# Patient Record
Sex: FEMALE | Race: OTHER | HISPANIC OR LATINO | URBAN - METROPOLITAN AREA
[De-identification: names, ages, dates, MRNs, and addresses within clinical notes are randomized per-mention and may not be internally consistent; named-entity substitution may affect disease eponyms.]

---

## 2018-12-14 ENCOUNTER — EMERGENCY (EMERGENCY)
Age: 38
LOS: 1 days | Discharge: HOME | End: 2018-12-14
Attending: EMERGENCY MEDICINE | Admitting: EMERGENCY MEDICINE
Payer: SELF-PAY

## 2018-12-14 VITALS
TEMPERATURE: 98 F | RESPIRATION RATE: 18 BRPM | DIASTOLIC BLOOD PRESSURE: 72 MMHG | HEART RATE: 89 BPM | OXYGEN SATURATION: 100 % | SYSTOLIC BLOOD PRESSURE: 116 MMHG

## 2018-12-14 LAB
ALBUMIN SERPL ELPH-MCNC: 4.1 G/DL — SIGNIFICANT CHANGE UP (ref 3.3–5)
ALP SERPL-CCNC: 85 U/L — SIGNIFICANT CHANGE UP (ref 40–120)
ALT FLD-CCNC: 15 U/L — SIGNIFICANT CHANGE UP (ref 4–33)
APPEARANCE UR: SIGNIFICANT CHANGE UP
APTT BLD: 30.4 SEC — SIGNIFICANT CHANGE UP (ref 27.5–36.3)
AST SERPL-CCNC: 16 U/L — SIGNIFICANT CHANGE UP (ref 4–32)
BACTERIA # UR AUTO: NEGATIVE — SIGNIFICANT CHANGE UP
BASE EXCESS BLDV CALC-SCNC: -0.9 MMOL/L — SIGNIFICANT CHANGE UP
BASOPHILS # BLD AUTO: 0.02 K/UL — SIGNIFICANT CHANGE UP (ref 0–0.2)
BASOPHILS # BLD AUTO: 0.02 K/UL — SIGNIFICANT CHANGE UP (ref 0–0.2)
BASOPHILS NFR BLD AUTO: 0.2 % — SIGNIFICANT CHANGE UP (ref 0–2)
BASOPHILS NFR BLD AUTO: 0.2 % — SIGNIFICANT CHANGE UP (ref 0–2)
BILIRUB SERPL-MCNC: 0.2 MG/DL — SIGNIFICANT CHANGE UP (ref 0.2–1.2)
BILIRUB UR-MCNC: NEGATIVE — SIGNIFICANT CHANGE UP
BLD GP AB SCN SERPL QL: NEGATIVE — SIGNIFICANT CHANGE UP
BLOOD GAS VENOUS - CREATININE: 0.65 MG/DL — SIGNIFICANT CHANGE UP (ref 0.5–1.3)
BLOOD UR QL VISUAL: HIGH
BUN SERPL-MCNC: 11 MG/DL — SIGNIFICANT CHANGE UP (ref 7–23)
CALCIUM SERPL-MCNC: 8.7 MG/DL — SIGNIFICANT CHANGE UP (ref 8.4–10.5)
CHLORIDE BLDV-SCNC: 108 MMOL/L — SIGNIFICANT CHANGE UP (ref 96–108)
CHLORIDE SERPL-SCNC: 104 MMOL/L — SIGNIFICANT CHANGE UP (ref 98–107)
CO2 SERPL-SCNC: 22 MMOL/L — SIGNIFICANT CHANGE UP (ref 22–31)
COLOR SPEC: COLORLESS — SIGNIFICANT CHANGE UP
CREAT SERPL-MCNC: 0.67 MG/DL — SIGNIFICANT CHANGE UP (ref 0.5–1.3)
EOSINOPHIL # BLD AUTO: 0.03 K/UL — SIGNIFICANT CHANGE UP (ref 0–0.5)
EOSINOPHIL # BLD AUTO: 0.04 K/UL — SIGNIFICANT CHANGE UP (ref 0–0.5)
EOSINOPHIL NFR BLD AUTO: 0.2 % — SIGNIFICANT CHANGE UP (ref 0–6)
EOSINOPHIL NFR BLD AUTO: 0.3 % — SIGNIFICANT CHANGE UP (ref 0–6)
GAS PNL BLDV: 137 MMOL/L — SIGNIFICANT CHANGE UP (ref 136–146)
GLUCOSE BLDV-MCNC: 100 — HIGH (ref 70–99)
GLUCOSE SERPL-MCNC: 96 MG/DL — SIGNIFICANT CHANGE UP (ref 70–99)
GLUCOSE UR-MCNC: NEGATIVE — SIGNIFICANT CHANGE UP
HCG SERPL-ACNC: < 5 MIU/ML — SIGNIFICANT CHANGE UP
HCO3 BLDV-SCNC: 24 MMOL/L — SIGNIFICANT CHANGE UP (ref 20–27)
HCT VFR BLD CALC: 25.6 % — LOW (ref 34.5–45)
HCT VFR BLD CALC: 28.4 % — LOW (ref 34.5–45)
HCT VFR BLDV CALC: 28.5 % — LOW (ref 34.5–45)
HGB BLD-MCNC: 8.2 G/DL — LOW (ref 11.5–15.5)
HGB BLD-MCNC: 9.3 G/DL — LOW (ref 11.5–15.5)
HGB BLDV-MCNC: 9.2 G/DL — LOW (ref 11.5–15.5)
HYALINE CASTS # UR AUTO: NEGATIVE — SIGNIFICANT CHANGE UP
IMM GRANULOCYTES # BLD AUTO: 0.04 # — SIGNIFICANT CHANGE UP
IMM GRANULOCYTES # BLD AUTO: 0.05 # — SIGNIFICANT CHANGE UP
IMM GRANULOCYTES NFR BLD AUTO: 0.3 % — SIGNIFICANT CHANGE UP (ref 0–1.5)
IMM GRANULOCYTES NFR BLD AUTO: 0.4 % — SIGNIFICANT CHANGE UP (ref 0–1.5)
INR BLD: 1.03 — SIGNIFICANT CHANGE UP (ref 0.88–1.17)
KETONES UR-MCNC: NEGATIVE — SIGNIFICANT CHANGE UP
LACTATE BLDV-MCNC: 1.2 MMOL/L — SIGNIFICANT CHANGE UP (ref 0.5–2)
LEUKOCYTE ESTERASE UR-ACNC: SIGNIFICANT CHANGE UP
LYMPHOCYTES # BLD AUTO: 1.3 K/UL — SIGNIFICANT CHANGE UP (ref 1–3.3)
LYMPHOCYTES # BLD AUTO: 1.79 K/UL — SIGNIFICANT CHANGE UP (ref 1–3.3)
LYMPHOCYTES # BLD AUTO: 11.2 % — LOW (ref 13–44)
LYMPHOCYTES # BLD AUTO: 14.9 % — SIGNIFICANT CHANGE UP (ref 13–44)
MCHC RBC-ENTMCNC: 27 PG — SIGNIFICANT CHANGE UP (ref 27–34)
MCHC RBC-ENTMCNC: 28.1 PG — SIGNIFICANT CHANGE UP (ref 27–34)
MCHC RBC-ENTMCNC: 32 % — SIGNIFICANT CHANGE UP (ref 32–36)
MCHC RBC-ENTMCNC: 32.7 % — SIGNIFICANT CHANGE UP (ref 32–36)
MCV RBC AUTO: 84.2 FL — SIGNIFICANT CHANGE UP (ref 80–100)
MCV RBC AUTO: 85.8 FL — SIGNIFICANT CHANGE UP (ref 80–100)
MONOCYTES # BLD AUTO: 0.6 K/UL — SIGNIFICANT CHANGE UP (ref 0–0.9)
MONOCYTES # BLD AUTO: 0.79 K/UL — SIGNIFICANT CHANGE UP (ref 0–0.9)
MONOCYTES NFR BLD AUTO: 5.2 % — SIGNIFICANT CHANGE UP (ref 2–14)
MONOCYTES NFR BLD AUTO: 6.6 % — SIGNIFICANT CHANGE UP (ref 2–14)
NEUTROPHILS # BLD AUTO: 9.38 K/UL — HIGH (ref 1.8–7.4)
NEUTROPHILS # BLD AUTO: 9.64 K/UL — HIGH (ref 1.8–7.4)
NEUTROPHILS NFR BLD AUTO: 77.8 % — HIGH (ref 43–77)
NEUTROPHILS NFR BLD AUTO: 82.7 % — HIGH (ref 43–77)
NITRITE UR-MCNC: NEGATIVE — SIGNIFICANT CHANGE UP
NRBC # FLD: 0 — SIGNIFICANT CHANGE UP
NRBC # FLD: 0 — SIGNIFICANT CHANGE UP
PCO2 BLDV: 36 MMHG — LOW (ref 41–51)
PH BLDV: 7.42 PH — SIGNIFICANT CHANGE UP (ref 7.32–7.43)
PH UR: 6.5 — SIGNIFICANT CHANGE UP (ref 5–8)
PLATELET # BLD AUTO: 324 K/UL — SIGNIFICANT CHANGE UP (ref 150–400)
PLATELET # BLD AUTO: 352 K/UL — SIGNIFICANT CHANGE UP (ref 150–400)
PMV BLD: 9.6 FL — SIGNIFICANT CHANGE UP (ref 7–13)
PMV BLD: 9.6 FL — SIGNIFICANT CHANGE UP (ref 7–13)
PO2 BLDV: 67 MMHG — HIGH (ref 35–40)
POTASSIUM BLDV-SCNC: 3.5 MMOL/L — SIGNIFICANT CHANGE UP (ref 3.4–4.5)
POTASSIUM SERPL-MCNC: 3.7 MMOL/L — SIGNIFICANT CHANGE UP (ref 3.5–5.3)
POTASSIUM SERPL-SCNC: 3.7 MMOL/L — SIGNIFICANT CHANGE UP (ref 3.5–5.3)
PROT SERPL-MCNC: 7.3 G/DL — SIGNIFICANT CHANGE UP (ref 6–8.3)
PROT UR-MCNC: NEGATIVE — SIGNIFICANT CHANGE UP
PROTHROM AB SERPL-ACNC: 11.5 SEC — SIGNIFICANT CHANGE UP (ref 9.8–13.1)
RBC # BLD: 3.04 M/UL — LOW (ref 3.8–5.2)
RBC # BLD: 3.31 M/UL — LOW (ref 3.8–5.2)
RBC # FLD: 12.6 % — SIGNIFICANT CHANGE UP (ref 10.3–14.5)
RBC # FLD: 12.9 % — SIGNIFICANT CHANGE UP (ref 10.3–14.5)
RBC CASTS # UR COMP ASSIST: >50 — HIGH (ref 0–?)
RH IG SCN BLD-IMP: POSITIVE — SIGNIFICANT CHANGE UP
RH IG SCN BLD-IMP: POSITIVE — SIGNIFICANT CHANGE UP
SAO2 % BLDV: 93 % — HIGH (ref 60–85)
SODIUM SERPL-SCNC: 140 MMOL/L — SIGNIFICANT CHANGE UP (ref 135–145)
SP GR SPEC: 1.02 — SIGNIFICANT CHANGE UP (ref 1–1.04)
SQUAMOUS # UR AUTO: SIGNIFICANT CHANGE UP
UROBILINOGEN FLD QL: NORMAL — SIGNIFICANT CHANGE UP
WBC # BLD: 11.65 K/UL — HIGH (ref 3.8–10.5)
WBC # BLD: 12.05 K/UL — HIGH (ref 3.8–10.5)
WBC # FLD AUTO: 11.65 K/UL — HIGH (ref 3.8–10.5)
WBC # FLD AUTO: 12.05 K/UL — HIGH (ref 3.8–10.5)
WBC UR QL: SIGNIFICANT CHANGE UP (ref 0–?)

## 2018-12-14 PROCEDURE — 76830 TRANSVAGINAL US NON-OB: CPT | Mod: 26

## 2018-12-14 PROCEDURE — 99285 EMERGENCY DEPT VISIT HI MDM: CPT | Mod: 25

## 2018-12-14 PROCEDURE — 74177 CT ABD & PELVIS W/CONTRAST: CPT | Mod: 26

## 2018-12-14 RX ORDER — SODIUM CHLORIDE 9 MG/ML
1000 INJECTION, SOLUTION INTRAVENOUS ONCE
Qty: 0 | Refills: 0 | Status: COMPLETED | OUTPATIENT
Start: 2018-12-14 | End: 2018-12-14

## 2018-12-14 RX ORDER — ACETAMINOPHEN 500 MG
975 TABLET ORAL ONCE
Qty: 0 | Refills: 0 | Status: COMPLETED | OUTPATIENT
Start: 2018-12-14 | End: 2018-12-14

## 2018-12-14 RX ORDER — TRANEXAMIC ACID 100 MG/ML
1000 INJECTION, SOLUTION INTRAVENOUS ONCE
Qty: 0 | Refills: 0 | Status: COMPLETED | OUTPATIENT
Start: 2018-12-14 | End: 2018-12-14

## 2018-12-14 RX ORDER — MORPHINE SULFATE 50 MG/1
4 CAPSULE, EXTENDED RELEASE ORAL ONCE
Qty: 0 | Refills: 0 | Status: DISCONTINUED | OUTPATIENT
Start: 2018-12-14 | End: 2018-12-14

## 2018-12-14 RX ORDER — ACETAMINOPHEN 500 MG
650 TABLET ORAL ONCE
Qty: 0 | Refills: 0 | Status: DISCONTINUED | OUTPATIENT
Start: 2018-12-14 | End: 2018-12-14

## 2018-12-14 RX ADMIN — TRANEXAMIC ACID 220 MILLIGRAM(S): 100 INJECTION, SOLUTION INTRAVENOUS at 22:33

## 2018-12-14 RX ADMIN — Medication 975 MILLIGRAM(S): at 22:33

## 2018-12-14 RX ADMIN — Medication 975 MILLIGRAM(S): at 19:11

## 2018-12-14 RX ADMIN — MORPHINE SULFATE 4 MILLIGRAM(S): 50 CAPSULE, EXTENDED RELEASE ORAL at 20:35

## 2018-12-14 RX ADMIN — SODIUM CHLORIDE 1000 MILLILITER(S): 9 INJECTION, SOLUTION INTRAVENOUS at 22:12

## 2018-12-14 RX ADMIN — SODIUM CHLORIDE 1000 MILLILITER(S): 9 INJECTION, SOLUTION INTRAVENOUS at 19:06

## 2018-12-14 RX ADMIN — MORPHINE SULFATE 4 MILLIGRAM(S): 50 CAPSULE, EXTENDED RELEASE ORAL at 22:12

## 2018-12-14 NOTE — ED STATDOCS - OBJECTIVE STATEMENT
I performed a medical screening examination and determined this patient to be medically stable and will transfer to the [facility] adult ED for further care. heart and lung exam done and both did not reveal concerns for immediate intervention.     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 Stable for transfer to [facility] ED

## 2018-12-14 NOTE — ED ADULT NURSE NOTE - OBJECTIVE STATEMENT
pt alert,oriented x3. skin warm,dry. c/o lower abd pains ,vag blleding since wed. denies n,v. eval by mds,iv,labs sent. will continue to monitor

## 2018-12-14 NOTE — ED PROVIDER NOTE - PHYSICAL EXAMINATION
PHYSICAL EXAM:  GENERAL: NAD, speaks in full sentences, no signs of respiratory distress  HEAD:  Atraumatic, Normocephalic  EYES: EOMI, PERRLA, conjunctiva and sclera clear  NECK: Supple, No JVD  CHEST/LUNG: Clear to auscultation bilaterally; No wheeze; No crackles; No accessory muscles used  HEART: Regular rate and rhythm; No murmurs;   ABDOMEN: Tender to lower quadrants moresore to RLQ w/ guarding  EXTREMITIES:  2+ Peripheral Pulses, No cyanosis or edema  PSYCH: AAOx3  NEUROLOGY: moving all extremities  SKIN: No rashes or lesions  Pelvic exam: bleeding visualized from closed Os, R adnexal tenderness

## 2018-12-14 NOTE — ED PROVIDER NOTE - NS ED ROS FT
CONSTITUTIONAL: No fevers, no chills  Eyes: no visual changes  Ears: no ear drainage, no ear pain  Nose: no nasal congestion  Mouth/Throat: no sore throat  Cardiovascular: No Chest pain  Respiratory: No SOB  Gastrointestinal: refer to HPI  Genitourinary: no dysuria, no hematuria  SKIN: no rashes.  NEURO: no headache  PSYCHIATRIC: no known mental health issues.

## 2018-12-14 NOTE — ED PROVIDER NOTE - OBJECTIVE STATEMENT
37yo F  p/w cc of pelvic pain    Had period start on Wednesday, normally painless, as of last night had onset of 10/10 R abdominal pain that has been constant, along with heavier bleeding then normal. Had IUP in place, no longer can feel IUD string since heavy period last month. No nausea or vomiting or diarrhea, no F/C. H/O cholestectomy 10 years ago. 39yo F  p/w cc of pelvic pain    Had period start on Wednesday, normally painless, as of last night had onset of 10/10 R abdominal pain that has been constant, along with heavier bleeding then normal. Had IUP in place, no longer can feel IUD string since heavy period last month. No nausea or vomiting or diarrhea, no F/C. H/O cholestectomy 10 years ago.    Attendinyo female presents with vaginal bleeding and RLQ pain.  Bleeding began 2 days ago.  Yesterday pt developed sharp, constant pain in the RLQ.  pain is worse with movement.  no fever.  no nausea or vomiting.

## 2018-12-14 NOTE — ED PROVIDER NOTE - MEDICAL DECISION MAKING DETAILS
37yo F  p/w cc of pelvic pain. Will get labs/pain control/fluids/ OBGYN and surgery consult due to extreme tenderness on exam w/ vaginal bleeding

## 2018-12-14 NOTE — CONSULT NOTE ADULT - ATTENDING COMMENTS
Pt seen and examined at bedside.  Speculum exam notable for small clot at the external os, with small pooling, no active bleeding at present.  No CMT.  Tenderness in the RLQ to palpation.  Mirena IUD not visualized on bedside TVUS; recommend official TVUS to r/o endometrial polyp vs fibroid as noted on CT scan.  Pt believes she may have expelled IUD last month as she had a similar episode of very heavy menses.  At this point, there is no acute gyn surgical intervention necessary as she is having a heavy menstrual cycle.  Recommend Tranexamic acid for AUB, as well as plan for outpatient removal of endometrial polyp vs fibroid as will be further elucidated on TVUS.  Would trend Hct and maintain herrera until Hg stable.  Transfuse as indicated.  Recommend Gen surg consultation w/regards to RLQ pain, as finding of epiploic appendigitis c/w patients area of pain/tenderness.  Will follow in CDU with outpatient follow up with gynecologist.

## 2018-12-14 NOTE — ED PROVIDER NOTE - PROGRESS NOTE DETAILS
bobby pgy1: OBGYN resident [facility] states this is likely menses w/ Epiploic appendagitis, recommends txa and to monitor. Pt VSS systolic >100, will give TXA as per oBGYN and monitor. bobby pgy1: pt stable, will monitor in CDU overnight as per LEXIE Acerra:  Pt with pain much improved.  hemodynamically stable.  likely pt is having menses and RLQ pain is due to epiploic appendagitis.  Will monitor overnight to ensure pt's symptoms improve and that her blood counts remain stable

## 2018-12-14 NOTE — CONSULT NOTE ADULT - SUBJECTIVE AND OBJECTIVE BOX
General Surgery Consult Note  Pager [phone]    HPI:  38-year-old woman s/p open qian 10 years ago presented with worsening RLQ abdominal pain x 2 days. Denies nausea/vomiting, fevers/chills. Normal urination and BMs.   Never had this kind of pain before.   Also has been having mennorhagia since the start of her period 2 days ago.     PAST MEDICAL & SURGICAL HISTORY:  Open cholecystectomy	    ALLERGIES:  NKA      HOME MEDICATIONS:  None      SOCIAL HISTORY:  Denies smoking and ETOH use.       FAMILY HISTORY:  No pertinent history in first degree relatives.  ___________________________________________  REVIEW OF SYSTEMS:  Constitutional: No fevers, chills, no recent weight loss  ENMT: No changes in hearing, no changes in vision, no sore throat, no cough  Respiratory: No shortness of breath  Cardiovascular: No chest pain, palpitations  Gastrointestinal: See HPI  Genitourinary: No dysuria, frequency, or urgency    Extremities: No joint swelling, no limited range of movement  Neurological: No paresthesia  Skin: No rashes  ___________________________________________  PHYSICAL EXAM:  Vital Signs Last 24 Hrs  T(C): 37.1 (14 Dec 2018 20:34), Max: 37.1 (14 Dec 2018 20:34)  T(F): 98.7 (14 Dec 2018 20:34), Max: 98.7 (14 Dec 2018 20:34)  HR: 68 (14 Dec 2018 21:35) (68 - 89)  BP: 108/59 (14 Dec 2018 21:35) (90/47 - 123/63)  BP(mean): --  RR: 18 (14 Dec 2018 21:35) (16 - 18)  SpO2: 100% (14 Dec 2018 21:35) (98% - 100%)CAPILLARY BLOOD GLUCOSE      General: A&Ox3, distress from pain.  Neuro: Motor and sensory grossly intact with no focal deficits.  HEENT: Anicteric sclerae.  Respiratory: Unlabored breathing.   CVS: Regular rate and rhythm.  Abdomen: Soft, non-distended, significant RLQ tenderness with rebound. Upper epigastric scar well-healed.  Extremities: Warm bilaterally w/ palpable pulses.   MSK: Intact ROM.  ____________________________________________  LABS:  CBC Full  -  ( 14 Dec 2018 21:30 )  WBC Count : 12.05 K/uL  Hemoglobin : 8.2 g/dL  Hematocrit : 25.6 %  Platelet Count - Automated : 324 K/uL  Mean Cell Volume : 84.2 fL  Mean Cell Hemoglobin : 27.0 pg  Mean Cell Hemoglobin Concentration : 32.0 %  Auto Neutrophil # : 9.38 K/uL  Auto Lymphocyte # : 1.79 K/uL  Auto Monocyte # : 0.79 K/uL  Auto Eosinophil # : 0.03 K/uL  Auto Basophil # : 0.02 K/uL  Auto Neutrophil % : 77.8 %  Auto Lymphocyte % : 14.9 %  Auto Monocyte % : 6.6 %  Auto Eosinophil % : 0.2 %  Auto Basophil % : 0.2 %        140  |  104  |  11  ----------------------------<  96  3.7   |  22  |  0.67    Ca    8.7      14 Dec 2018 19:10    TPro  7.3  /  Alb  4.1  /  TBili  0.2  /  DBili  x   /  AST  16  /  ALT  15  /  AlkPhos  85  12-14    LIVER FUNCTIONS - ( 14 Dec 2018 19:10 )  Alb: 4.1 g/dL / Pro: 7.3 g/dL / ALK PHOS: 85 u/L / ALT: 15 u/L / AST: 16 u/L / GGT: x           PT/INR - ( 14 Dec 2018 19:10 )   PT: 11.5 SEC;   INR: 1.03          PTT - ( 14 Dec 2018 19:10 )  PTT:30.4 SEC  Urinalysis Basic - ( 14 Dec 2018 20:40 )    Color: COLORLESS / Appearance: Lt TURBID / S.016 / pH: 6.5  Gluc: NEGATIVE / Ketone: NEGATIVE  / Bili: NEGATIVE / Urobili: NORMAL   Blood: LARGE / Protein: NEGATIVE / Nitrite: NEGATIVE   Leuk Esterase: TRACE / RBC: >50 / WBC 3-5   Sq Epi: FEW / Non Sq Epi: x / Bacteria: NEGATIVE    ____________________________________________  RADIOLOGY:  CT Abdomen and Pelvis w/ IV Cont (18 @ 20:20)   LOWER CHEST: Mild dependent atelectasis is seen at the lung bases. No   consolidation or pleural effusion.    LIVER: Within normal limits.  BILE DUCTS: Normal caliber.  GALLBLADDER: Not visualized.  SPLEEN: Within normal limits.  PANCREAS: Within normal limits.  ADRENALS: Within normal limits.  KIDNEYS/URETERS: Symmetrical enhancement. No hydronephrosis.    BLADDER: Within normal limits.  REPRODUCTIVE ORGANS: Within the mid endometrial canal, there is a   well-circumscribed 2.2 x 2cm hypodense lesion with peripheral   enhancement. A 2 cm hypodense lesion within the right ovary is   indeterminate. No intrauterine device is visualized.    BOWEL: 1.5 cm ovoid focus of fat with peripheral stranding at the level   of cecum, consistent with epiploic appendagitis. No bowel obstruction.   The appendix is not visualized.   PERITONEUM: No ascites. There are few prominent subcentimeter right lower   quadrant lymph nodes with1.5 cm ovoid focus of fat with peripheral   stranding at thelevel of cecum as mentioned above.  VESSELS:  Within normal limits.  RETROPERITONEUM: No bulky lymphadenopathy.    ABDOMINAL WALL: Within normal limits.  BONES: Within normal limits.    IMPRESSION:   Epiploic appendagitis at the level of cecum.    2.2 cm peripherally enhancing well-circumscribed endometrial lesion which   may represent a submucosal pedunculated fibroid versus polyp, better   evaluated with pelvic ultrasound. 2.2 cm hypodensity within the right   ovary which also may be better evaluated with pending pelvic ultrasound.   No intrauterine device is visualized.    The appendix is not visualized.
R2 GYN CONSULT NOTE    38y  LMP started Wednesday (last period before this ) w IUD placed at [facility] 4 years ago, presents w LLQ pain since Wednesday.  10/10 pain, pt states pain is constant, has not changed, tried taking tylenol did not help.    Denies lightheadedness, dizziness, nausea, vomiting, fevers, chills, diarrhea, constipation, urinary symptoms.  Last ate at 3pm. States has never experienced this pain before.  +VB since Wednesday, no abnl vaginal discharge.    OB/GYN HISTORY:  x6    Surgical History:  open qian     Past Medical History: denies    Social: denies T/E/D    Meds: none    Allergies: No Known Allergies    REVIEW OF SYSTEMS:  see HPI, otherwise neg      OBJECTIVE FINDINGS:    Vital Signs Last 24 Hrs  T(C): 36.6 (14 Dec 2018 17:52), Max: 36.8 (14 Dec 2018 17:22)  T(F): 97.8 (14 Dec 2018 17:52), Max: 98.2 (14 Dec 2018 17:22)  HR: 78 (14 Dec 2018 18:51) (78 - 89)  BP: 107/54 (14 Dec 2018 18:51) (107/54 - 123/63)  RR: 16 (14 Dec 2018 18:51) (16 - 18)  SpO2: 100% (14 Dec 2018 18:51) (99% - 100%)      PE:  Gen: Comfortable, NAD  CV: RRR  Pulm: CTAB  Abd: Soft, w rebound, +rovsing's sign, tender to palpation w guarding at mcburneys pt, +BS  Ext: No edema or tenderness bilaterally  Spec Exam: grossly appeared wnl, blood in vaginal vault, bleeding from cervical os noted on exam  Bimanual: +CMT, generalized abd tenderness greater in LLQ, no masses palpated in adnexa    LABS:                        9.3    11.65 )-----------( 352      ( 14 Dec 2018 19:10 )             28.4         PT/INR - ( 14 Dec 2018 19:10 )   PT: 11.5 SEC;   INR: 1.03          PTT - ( 14 Dec 2018 19:10 )  PTT:30.4 SEC        RADIOLOGY & ADDITIONAL STUDIES:  pending CT, TVUS

## 2018-12-14 NOTE — ED ADULT NURSE REASSESSMENT NOTE - NS ED NURSE REASSESS COMMENT FT1
2130: OB at bedside, herrera placed by OB, vitals stable at this time. Patient awake, alert, and mentating. No acute distress at this time, pending dispo. Will continue to monitor

## 2018-12-15 VITALS
HEART RATE: 80 BPM | OXYGEN SATURATION: 100 % | SYSTOLIC BLOOD PRESSURE: 100 MMHG | RESPIRATION RATE: 16 BRPM | TEMPERATURE: 98 F | DIASTOLIC BLOOD PRESSURE: 49 MMHG

## 2018-12-15 DIAGNOSIS — Z90.49 ACQUIRED ABSENCE OF OTHER SPECIFIED PARTS OF DIGESTIVE TRACT: Chronic | ICD-10-CM

## 2018-12-15 LAB
BASOPHILS # BLD AUTO: 0.01 K/UL — SIGNIFICANT CHANGE UP (ref 0–0.2)
BASOPHILS # BLD AUTO: 0.01 K/UL — SIGNIFICANT CHANGE UP (ref 0–0.2)
BASOPHILS NFR BLD AUTO: 0.1 % — SIGNIFICANT CHANGE UP (ref 0–2)
BASOPHILS NFR BLD AUTO: 0.1 % — SIGNIFICANT CHANGE UP (ref 0–2)
EOSINOPHIL # BLD AUTO: 0.02 K/UL — SIGNIFICANT CHANGE UP (ref 0–0.5)
EOSINOPHIL # BLD AUTO: 0.08 K/UL — SIGNIFICANT CHANGE UP (ref 0–0.5)
EOSINOPHIL NFR BLD AUTO: 0.2 % — SIGNIFICANT CHANGE UP (ref 0–6)
EOSINOPHIL NFR BLD AUTO: 1.1 % — SIGNIFICANT CHANGE UP (ref 0–6)
HBA1C BLD-MCNC: 5.1 % — SIGNIFICANT CHANGE UP (ref 4–5.6)
HCT VFR BLD CALC: 24.3 % — LOW (ref 34.5–45)
HCT VFR BLD CALC: 25 % — LOW (ref 34.5–45)
HGB BLD-MCNC: 7.9 G/DL — LOW (ref 11.5–15.5)
HGB BLD-MCNC: 7.9 G/DL — LOW (ref 11.5–15.5)
IMM GRANULOCYTES # BLD AUTO: 0.02 # — SIGNIFICANT CHANGE UP
IMM GRANULOCYTES # BLD AUTO: 0.03 # — SIGNIFICANT CHANGE UP
IMM GRANULOCYTES NFR BLD AUTO: 0.3 % — SIGNIFICANT CHANGE UP (ref 0–1.5)
IMM GRANULOCYTES NFR BLD AUTO: 0.3 % — SIGNIFICANT CHANGE UP (ref 0–1.5)
LYMPHOCYTES # BLD AUTO: 1.49 K/UL — SIGNIFICANT CHANGE UP (ref 1–3.3)
LYMPHOCYTES # BLD AUTO: 17.2 % — SIGNIFICANT CHANGE UP (ref 13–44)
LYMPHOCYTES # BLD AUTO: 2.37 K/UL — SIGNIFICANT CHANGE UP (ref 1–3.3)
LYMPHOCYTES # BLD AUTO: 33.5 % — SIGNIFICANT CHANGE UP (ref 13–44)
MCHC RBC-ENTMCNC: 27.4 PG — SIGNIFICANT CHANGE UP (ref 27–34)
MCHC RBC-ENTMCNC: 27.5 PG — SIGNIFICANT CHANGE UP (ref 27–34)
MCHC RBC-ENTMCNC: 31.6 % — LOW (ref 32–36)
MCHC RBC-ENTMCNC: 32.5 % — SIGNIFICANT CHANGE UP (ref 32–36)
MCV RBC AUTO: 84.4 FL — SIGNIFICANT CHANGE UP (ref 80–100)
MCV RBC AUTO: 87.1 FL — SIGNIFICANT CHANGE UP (ref 80–100)
MONOCYTES # BLD AUTO: 0.41 K/UL — SIGNIFICANT CHANGE UP (ref 0–0.9)
MONOCYTES # BLD AUTO: 0.49 K/UL — SIGNIFICANT CHANGE UP (ref 0–0.9)
MONOCYTES NFR BLD AUTO: 5.6 % — SIGNIFICANT CHANGE UP (ref 2–14)
MONOCYTES NFR BLD AUTO: 5.8 % — SIGNIFICANT CHANGE UP (ref 2–14)
NEUTROPHILS # BLD AUTO: 4.18 K/UL — SIGNIFICANT CHANGE UP (ref 1.8–7.4)
NEUTROPHILS # BLD AUTO: 6.64 K/UL — SIGNIFICANT CHANGE UP (ref 1.8–7.4)
NEUTROPHILS NFR BLD AUTO: 59.2 % — SIGNIFICANT CHANGE UP (ref 43–77)
NEUTROPHILS NFR BLD AUTO: 76.6 % — SIGNIFICANT CHANGE UP (ref 43–77)
NRBC # FLD: 0 — SIGNIFICANT CHANGE UP
NRBC # FLD: 0 — SIGNIFICANT CHANGE UP
PLATELET # BLD AUTO: 307 K/UL — SIGNIFICANT CHANGE UP (ref 150–400)
PLATELET # BLD AUTO: 322 K/UL — SIGNIFICANT CHANGE UP (ref 150–400)
PMV BLD: 10.1 FL — SIGNIFICANT CHANGE UP (ref 7–13)
PMV BLD: 10.1 FL — SIGNIFICANT CHANGE UP (ref 7–13)
RBC # BLD: 2.87 M/UL — LOW (ref 3.8–5.2)
RBC # BLD: 2.88 M/UL — LOW (ref 3.8–5.2)
RBC # FLD: 12.8 % — SIGNIFICANT CHANGE UP (ref 10.3–14.5)
RBC # FLD: 12.8 % — SIGNIFICANT CHANGE UP (ref 10.3–14.5)
WBC # BLD: 7.07 K/UL — SIGNIFICANT CHANGE UP (ref 3.8–10.5)
WBC # BLD: 8.68 K/UL — SIGNIFICANT CHANGE UP (ref 3.8–10.5)
WBC # FLD AUTO: 7.07 K/UL — SIGNIFICANT CHANGE UP (ref 3.8–10.5)
WBC # FLD AUTO: 8.68 K/UL — SIGNIFICANT CHANGE UP (ref 3.8–10.5)

## 2018-12-15 PROCEDURE — 99234 HOSP IP/OBS SM DT SF/LOW 45: CPT

## 2018-12-15 RX ORDER — TRANEXAMIC ACID 100 MG/ML
2 INJECTION, SOLUTION INTRAVENOUS
Qty: 30 | Refills: 0 | OUTPATIENT
Start: 2018-12-15 | End: 2018-12-19

## 2018-12-15 RX ORDER — ACETAMINOPHEN 500 MG
1000 TABLET ORAL ONCE
Qty: 0 | Refills: 0 | Status: COMPLETED | OUTPATIENT
Start: 2018-12-15 | End: 2018-12-15

## 2018-12-15 RX ORDER — FERROUS SULFATE 325(65) MG
1 TABLET ORAL
Qty: 14 | Refills: 0 | OUTPATIENT
Start: 2018-12-15 | End: 2018-12-28

## 2018-12-15 RX ORDER — DOCUSATE SODIUM 100 MG
1 CAPSULE ORAL
Qty: 28 | Refills: 0 | OUTPATIENT
Start: 2018-12-15 | End: 2018-12-28

## 2018-12-15 RX ORDER — TRANEXAMIC ACID 100 MG/ML
1300 INJECTION, SOLUTION INTRAVENOUS ONCE
Qty: 0 | Refills: 0 | Status: DISCONTINUED | OUTPATIENT
Start: 2018-12-15 | End: 2018-12-15

## 2018-12-15 RX ORDER — TRANEXAMIC ACID 100 MG/ML
1300 INJECTION, SOLUTION INTRAVENOUS THREE TIMES A DAY
Qty: 0 | Refills: 0 | Status: DISCONTINUED | OUTPATIENT
Start: 2018-12-15 | End: 2018-12-18

## 2018-12-15 RX ORDER — FERROUS SULFATE 325(65) MG
1 TABLET ORAL
Qty: 14 | Refills: 0 | DISCHARGE
Start: 2018-12-15 | End: 2018-12-28

## 2018-12-15 RX ORDER — TRANEXAMIC ACID 650 MG/1
2 TABLET ORAL
Qty: 30 | Refills: 0 | DISCHARGE
Start: 2018-12-15 | End: 2018-12-19

## 2018-12-15 RX ADMIN — Medication 1000 MILLIGRAM(S): at 05:17

## 2018-12-15 RX ADMIN — Medication 400 MILLIGRAM(S): at 04:47

## 2018-12-15 RX ADMIN — TRANEXAMIC ACID 1300 MILLIGRAM(S): 100 INJECTION, SOLUTION INTRAVENOUS at 06:44

## 2018-12-15 RX ADMIN — TRANEXAMIC ACID 1000 MILLIGRAM(S): 100 INJECTION, SOLUTION INTRAVENOUS at 00:24

## 2018-12-15 RX ADMIN — TRANEXAMIC ACID 1300 MILLIGRAM(S): 100 INJECTION, SOLUTION INTRAVENOUS at 13:20

## 2018-12-15 NOTE — PROGRESS NOTE ADULT - SUBJECTIVE AND OBJECTIVE BOX
SUBJECTIVE: 39yo Woman seen and examined during morning rounds. No acute events overnight. Afebrile, VS stable. Pain well controlled with current regimen.     OBJECTIVE:    Physical Exam:  Gen: Resting in bed, NAD, alert and oriented  Resp: respirations unlabored, no increased WOB   Abd: soft, nondistended, improved RLQ tenderness to palpation and percussion    Vital Signs Last 24 Hrs  T(C): 36.7 (15 Dec 2018 10:18), Max: 37.1 (14 Dec 2018 20:34)  T(F): 98 (15 Dec 2018 10:18), Max: 98.7 (14 Dec 2018 20:34)  HR: 80 (15 Dec 2018 10:18) (63 - 89)  BP: 100/49 (15 Dec 2018 10:18) (90/47 - 123/63)  BP(mean): --  RR: 16 (15 Dec 2018 10:18) (16 - 18)  SpO2: 100% (15 Dec 2018 10:18) (98% - 100%)    I&O's Detail    14 Dec 2018 07:01  -  15 Dec 2018 07:00  --------------------------------------------------------  IN:    Oral Fluid: 150 mL  Total IN: 150 mL    OUT:    Indwelling Catheter - Urethral: 800 mL  Total OUT: 800 mL    Total NET: -650 mL      MEDICATIONS  (STANDING):  tranexamic  acid 1300 milliGRAM(s) Oral three times a day    MEDICATIONS  (PRN):      LABS:                        7.9    7.07  )-----------( 322      ( 15 Dec 2018 09:30 )             25.0       12-14    140  |  104  |  11  ----------------------------<  96  3.7   |  22  |  0.67    Ca    8.7      14 Dec 2018 19:10    TPro  7.3  /  Alb  4.1  /  TBili  0.2  /  DBili  x   /  AST  16  /  ALT  15  /  AlkPhos  85  12-14          NEGATIVE 12-14-18 @ 20:40

## 2018-12-15 NOTE — ED CDU PROVIDER INITIAL DAY NOTE - OBJECTIVE STATEMENT
39yo F  p/w cc of pelvic pain  Had period start on Wednesday, normally painless, as of last night had onset of 10/10 R abdominal pain that has been constant, along with heavier bleeding then normal. Had IUP in place, no longer can feel IUD string since heavy period last month. No nausea or vomiting or diarrhea, no F/C. H/O cholestectomy 10 years ago.    CDU POOL Hernandez Note--------  37 yo female, no stated PMH other than heavy menses, presented to the ED for vaginal bleeding and abdominal pain.  Pt. was evaluated in the ED, CT scan showed "Epiploic appendagitis at the level of cecum.  2.2 cm peripherally enhancing well-circumscribed endometrial lesion which may represent a submucosal pedunculated fibroid versus polyp, better evaluated with pelvic ultrasound. 2.2 cm hypodensity within the right ovary which also may be better evaluated with pending pelvic ultrasound. No intrauterine device is visualized.  The appendix is not visualized.".  US was performed which showed: "No sonographic evidence of ovarian torsion. Suboptimal visualization of endometrial echo-complex measuring 4mm. Incompletely characterized 2.8 x 2.5 x 2.4 cm heterogeneous lesion abutting or at the level of the endometrial echo complex. The findings are nonspecific, this may represent intracavitary/submucosal fibroid. However complex endometrial polypoid lesion not excluded. GYN consultation and direct visualization is advised to exclude endometrial malignancy.".    Surgery and Gyn were consulted; Surgery team advised "NSAIDs for epiploic appendigitis. No surgical intervention required."; Gyn had placed Walker catheter during their initial evaluation, advised TXA, serial CBC trending; state Walker can be removed once Hb/Hct has demonstrated stability.  On CDU arrival, pt with no active c/o; states no abdominal pain at time of arrival.  Pt denies dizziness, lightheadedness.   No other c/o.  No hx/o recent illness other than above. 39yo F  p/w cc of pelvic pain  Had period start on Wednesday, normally painless, as of last night had onset of 10/10 R abdominal pain that has been constant, along with heavier bleeding then normal. Had IUP in place, no longer can feel IUD string since heavy period last month. No nausea or vomiting or diarrhea, no F/C. H/O cholestectomy 10 years ago.    CDU POOL Hernandez Note--------  39 yo female, no stated PMH other than heavy menses, presented to the ED for vaginal bleeding and abdominal pain.  Pt. was evaluated in the ED, CT scan showed "Epiploic appendagitis at the level of cecum.  2.2 cm peripherally enhancing well-circumscribed endometrial lesion which may represent a submucosal pedunculated fibroid versus polyp, better evaluated with pelvic ultrasound. 2.2 cm hypodensity within the right ovary which also may be better evaluated with pending pelvic ultrasound. No intrauterine device is visualized.  The appendix is not visualized.".  US was performed which showed: "No sonographic evidence of ovarian torsion. Suboptimal visualization of endometrial echo-complex measuring 4mm. Incompletely characterized 2.8 x 2.5 x 2.4 cm heterogeneous lesion abutting or at the level of the endometrial echo complex. The findings are nonspecific, this may represent intracavitary/submucosal fibroid. However complex endometrial polypoid lesion not excluded. GYN consultation and direct visualization is advised to exclude endometrial malignancy.".    Surgery and Gyn were consulted; Surgery team advised "NSAIDs for epiploic appendigitis. No surgical intervention required."; Gyn had placed Walker catheter during their initial evaluation, advised TXA, serial CBC trending; state Walker can be removed once Hb/Hct has demonstrated stability.  On CDU arrival, pt with no active c/o; states no abdominal pain at time of arrival.  Pt denies dizziness, lightheadedness.   No other c/o.  No hx/o recent illness other than above.    Acerra:  Pt with vaginal bleeding and rlq pain.  pain resolved.  Vaginal bleeding slowed down.  recommending observation for serial abdominal exams and h/h.

## 2018-12-15 NOTE — PROGRESS NOTE ADULT - SUBJECTIVE AND OBJECTIVE BOX
Pt re-evaluated and seen and examined at bedside. Pt's vaginal bleeding decreased. C/o HA, but denies lightheadedness dizziness, SOB, CP. Endorses abdominal cramping, but is tolerable.    T(F): 98 (12-15-18 @ 10:18), Max: 98.7 (18 @ 20:34)  HR: 80 (12-15-18 @ 10:18) (63 - 89)  BP: 100/49 (12-15-18 @ 10:18) (90/47 - 123/63)  RR: 16 (12-15-18 @ 10:18) (16 - 18)  SpO2: 100% (12-15-18 @ 10:18) (98% - 100%)  Wt(kg): --  I&O's Summary    14 Dec 2018 07:01  -  15 Dec 2018 07:00  --------------------------------------------------------  IN: 150 mL / OUT: 800 mL / NET: -650 mL    15 Dec 2018 07:01  -  15 Dec 2018 12:14  --------------------------------------------------------  IN: 0 mL / OUT: 400 mL / NET: -400 mL        MEDICATIONS  (STANDING):  tranexamic  acid 1300 milliGRAM(s) Oral three times a day    MEDICATIONS  (PRN):      Physical Exam:  Constitutional: NAD  Pulmonary: clear to auscultation bilaterally   Cardiovascular: Regular rate and rhythm   Abdomen: soft, nontender, nondistended  Extremities: no lower extremity edema or calf tenderness    LABS:                        7.9    7.07  )-----------( 322      ( 15 Dec 2018 09:30 )             25.0   baso 0.1    eos 1.1    imm gran 0.3    lymph 33.5   mono 5.8    poly 59.2                         7.9    8.68  )-----------( 307      ( 15 Dec 2018 03:30 )             24.3   baso 0.1    eos 0.2    imm gran 0.3    lymph 17.2   mono 5.6    poly 76.6                         8.2    12.05 )-----------( 324      ( 14 Dec 2018 21:30 )             25.6   baso 0.2    eos 0.2    imm gran 0.3    lymph 14.9   mono 6.6    poly 77.8                         9.3    11.65 )-----------( 352      ( 14 Dec 2018 19:10 )             28.4   baso 0.2    eos 0.3    imm gran 0.4    lymph 11.2   mono 5.2    poly 82.7      @ 19:10    140  |  104  |  11  ----------------------------<  96  3.7   |  22  |  0.67        TPro  7.3  /  Alb  4.1  /  TBili  0.2  /  DBili  x   /  AST  16  /  ALT  15  /  AlkPhos  85   @ 19:10    PT/INR - ( 14 Dec 2018 19:10 )   PT: 11.5 SEC;   INR: 1.03          PTT - ( 14 Dec 2018 19:10 )  PTT:30.4 SEC  Urinalysis Basic - ( 14 Dec 2018 20:40 )    Color: COLORLESS / Appearance: Lt TURBID / S.016 / pH: 6.5  Gluc: NEGATIVE / Ketone: NEGATIVE  / Bili: NEGATIVE / Urobili: NORMAL   Blood: LARGE / Protein: NEGATIVE / Nitrite: NEGATIVE   Leuk Esterase: TRACE / RBC: >50 / WBC 3-5   Sq Epi: FEW / Non Sq Epi: x / Bacteria: NEGATIVE        RADIOLOGY & ADDITIONAL TESTS:

## 2018-12-15 NOTE — ED CDU PROVIDER DISPOSITION NOTE - ATTENDING CONTRIBUTION TO CARE
Deanne: 37 yo female with a h/o menorrhagia presented to the ED with heavy vaginal bleeding and RLQ abdominal pain. Pt found to be anemic and had epiploic appendagitis on CTAP. Pt pain now well controlled and feeling much better. Denies chets pain, SOB, dizziness, and near syncope. Pt evaluated by GYN in the ED who recommended TXA. Herrera was placed for questionable urinary retention on CT?. GYN recommended TXA. Bleeding has now resolved. Exam: GENERAL: well appearing, NAD, HEENT: MMM, PERRLA, CARDIO: +S1/S2, no murmurs, rubs or gallops, LUNGS: CTA B/L, no wheezing, rales or rhonchi, ABD: soft, nontender, BSx4 quadrants, no guarding or rigidity. : deferred to GYN MSK: No CVA TTP NEURO: AxOx3, SKIN: no rashes or lesions, well perfused A/P- 37 yo female with menorrhagia and anemia. Pt reevaluated by GYN, hemoglobin has been stable, pt spontaneously voiding without herrera. bleeding now resolved s/p TXA. will d/c home to f/u with outpatient GYN.

## 2018-12-15 NOTE — ED CDU PROVIDER DISPOSITION NOTE - NSFOLLOWUPINSTRUCTIONS_ED_ALL_ED_FT
Advance activity as tolerated.  Continue all previously prescribed medications as directed unless otherwise instructed.  Take Lysteda 650 mg two pills three times a day until vaginal bleeding decreases or stops; take for no more than 5 days.  Take Tylenol 650mg (Two 325 mg pills) every 4-6 hours as needed for pain or fevers.  Take Iron supplement as directed.  Take Colace 100 mg twice a day as needed for constipation.  Follow up with your primary care physician and OB/GYN (referal list provided or call [phone] to make an appointment with the OB/GYN clinic)  in 48-72 hours- bring copies of your results.  Return to the ER for worsening or persistent symptoms, including but not limited to any worsening/persistent vaginal bleeding, abdominal pain, lightheadedness, passing out and/or ANY NEW OR CONCERNING SYMPTOMS. If you have issues obtaining follow up, please call: [phone] to obtain a doctor or specialist who takes your insurance in your area.  You may call [phone] to make an appointment with the internal medicine clinic.

## 2018-12-15 NOTE — ED CDU PROVIDER INITIAL DAY NOTE - GASTROINTESTINAL, MLM
Abdomen soft, non-tender, no rebound, no guarding. Abdomen soft, TTP to right mid abdomen, left mid abdomen, and suprapubic region.  Otherwise, abdomen non-tender, no rebound, no guarding.

## 2018-12-15 NOTE — ED CDU PROVIDER DISPOSITION NOTE - CLINICAL COURSE
Deanne: 39 yo female with a h/o menorrhagia presented to the ED with heavy vaginal bleeding and RLQ abdominal pain. Pt found to be anemic and had epiploic appendagitis on CTAP. Pt pain now well controlled and feeling much better. Denies chets pain, SOB, dizziness, and near syncope. Pt evaluated by GYN in the ED who recommended TXA. Herrera was placed for questionable urinary retention on CT?. GYN recommended TXA. Bleeding has now resolved. Exam: GENERAL: well appearing, NAD, HEENT: MMM, PERRLA, CARDIO: +S1/S2, no murmurs, rubs or gallops, LUNGS: CTA B/L, no wheezing, rales or rhonchi, ABD: soft, nontender, BSx4 quadrants, no guarding or rigidity. : deferred to GYN MSK: No CVA TTP NEURO: AxOx3, SKIN: no rashes or lesions, well perfused A/P- 39 yo female with menorrhagia and anemia. Pt reevaluated by GYN, hemoglobin has been stable, pt spontaneously voiding without herrera. bleeding now resolved s/p TXA. will d/c home to f/u with outpatient GYN.

## 2018-12-15 NOTE — ED CDU PROVIDER INITIAL DAY NOTE - PROGRESS NOTE DETAILS
Pt stable, comfortable appearing, c/o abd pain c/w same symptoms pt has been having (as per pt).  330a cbc:  Hb 7.9; discussed with Gyn; Gyn feels H/H stable for now.  Next CBC due at 0930 hrs.  Tylenol IV will be given for pain. POOL Kelley:  Pt notes feeling better, reporting only mild RLQ pain.  Pt notes vaginal bleeding lighter today.  Pt denies any lightheadedness, chest pain, SOB, palpitations, syncope.  Hgb 7.9 this morning.  Repeat Hgb to be performed at 9:30a.  Abdomen soft, minimally tender at RLQ; no guarding; no rigidity; normoactive bowel sounds. POOL Kelley: Hgb stable at 7.9.  Pt has not had any more significant vaginal bleeding.  GYN recommends discharge and Lysteda 1300 mg TID x at most 5 days until vaginal bleeding decreases or stops.  Pt medically stable for discharge.  Pt denies any lightheadedness, chest pain or shortness of breath.  Pt to follow up with PMD and ob/gyn. POOL Kelley: Hgb stable at 7.9.  Pt has not had any more significant vaginal bleeding.  GYN recommends discharge and Lysteda 1300 mg TID x at most 5 days until vaginal bleeding decreases or stops.  Pt medically stable for discharge.  Pt denies any lightheadedness, chest pain or shortness of breath.  Pt to follow up with PMD and ob/gyn.  [place]  ID [id number] used for discharge.

## 2018-12-15 NOTE — ED CDU PROVIDER INITIAL DAY NOTE - MEDICAL DECISION MAKING DETAILS
Vaginal bleeding / heavy menses:  TXA per Gyn team recommendations / Walker catheter to stay in place until Hb/Hct show stability, serial CBCs, general observation care / monitoring.  Epiploic appendagitis:  Analgesia PRN, supportive care, periodic abdominal exams, Surgery team reassessment.

## 2018-12-15 NOTE — PROGRESS NOTE ADULT - ASSESSMENT
Vaginal bleeding improved with TXA. H/H stable. VSS.     -Cleared for discharge home with f/u outpatient. Will email [facility] to schedule appt.  -Lysteda 1300 mg TID during heavy menses for up to 5 days or until VB stops  -Fe for anemia  -Tylenol for MORRIS     SAMI Clifton PGY2

## 2018-12-16 LAB
BACTERIA UR CULT: SIGNIFICANT CHANGE UP
SPECIMEN SOURCE: SIGNIFICANT CHANGE UP

## 2021-09-17 NOTE — PROGRESS NOTE ADULT - ASSESSMENT
Assessment/Plan: 38y Female with RLQ abdominal pain, found to have epiploic appendigitis and an endometrial lesion (fibroid vs. polyp).   Slight leukocytosis. Hemodynamically stable.    - NSAIDs for epiploic appendigitis. No surgical intervention required.  - Follow up gynecology recommendations regarding endometrial lesion (?degenerative fibroid)  - If symptoms worsen, consider repeating the CT scan to better visualize the appendix  - No acute surgical needs at this time. Please page us with any further questions or concerns.    JUAN Anderson, PGY-2  B Team Surgery  p. [phone] patient declined/done

## 2024-06-24 ENCOUNTER — EMERGENCY (EMERGENCY)
Facility: HOSPITAL | Age: 44
LOS: 1 days | Discharge: HOME | End: 2024-06-24
Attending: STUDENT IN AN ORGANIZED HEALTH CARE EDUCATION/TRAINING PROGRAM | Admitting: EMERGENCY MEDICINE
Payer: MEDICAID

## 2024-06-24 VITALS
RESPIRATION RATE: 16 BRPM | HEART RATE: 94 BPM | DIASTOLIC BLOOD PRESSURE: 85 MMHG | OXYGEN SATURATION: 97 % | TEMPERATURE: 98 F | SYSTOLIC BLOOD PRESSURE: 125 MMHG | WEIGHT: 128.97 LBS | HEIGHT: 56 IN

## 2024-06-24 DIAGNOSIS — Z90.49 ACQUIRED ABSENCE OF OTHER SPECIFIED PARTS OF DIGESTIVE TRACT: Chronic | ICD-10-CM

## 2024-06-24 LAB
ALBUMIN SERPL ELPH-MCNC: 4.1 G/DL — SIGNIFICANT CHANGE UP (ref 3.3–5)
ALP SERPL-CCNC: 118 U/L — SIGNIFICANT CHANGE UP (ref 40–120)
ALT FLD-CCNC: 17 U/L — SIGNIFICANT CHANGE UP (ref 4–33)
ANION GAP SERPL CALC-SCNC: 13 MMOL/L — SIGNIFICANT CHANGE UP (ref 7–14)
APPEARANCE UR: CLEAR — SIGNIFICANT CHANGE UP
APTT BLD: 31.2 SEC — SIGNIFICANT CHANGE UP (ref 24.5–35.6)
AST SERPL-CCNC: 19 U/L — SIGNIFICANT CHANGE UP (ref 4–32)
BACTERIA # UR AUTO: ABNORMAL /HPF
BASOPHILS # BLD AUTO: 0.03 K/UL — SIGNIFICANT CHANGE UP (ref 0–0.2)
BASOPHILS NFR BLD AUTO: 0.2 % — SIGNIFICANT CHANGE UP (ref 0–2)
BILIRUB SERPL-MCNC: <0.2 MG/DL — SIGNIFICANT CHANGE UP (ref 0.2–1.2)
BILIRUB UR-MCNC: NEGATIVE — SIGNIFICANT CHANGE UP
BUN SERPL-MCNC: 17 MG/DL — SIGNIFICANT CHANGE UP (ref 7–23)
CALCIUM SERPL-MCNC: 9 MG/DL — SIGNIFICANT CHANGE UP (ref 8.4–10.5)
CAST: 0 /LPF — SIGNIFICANT CHANGE UP (ref 0–4)
CHLORIDE SERPL-SCNC: 104 MMOL/L — SIGNIFICANT CHANGE UP (ref 98–107)
CO2 SERPL-SCNC: 20 MMOL/L — LOW (ref 22–31)
COLOR SPEC: YELLOW — SIGNIFICANT CHANGE UP
CREAT SERPL-MCNC: 0.5 MG/DL — SIGNIFICANT CHANGE UP (ref 0.5–1.3)
DIFF PNL FLD: ABNORMAL
EGFR: 120 ML/MIN/1.73M2 — SIGNIFICANT CHANGE UP
EOSINOPHIL # BLD AUTO: 0.06 K/UL — SIGNIFICANT CHANGE UP (ref 0–0.5)
EOSINOPHIL NFR BLD AUTO: 0.4 % — SIGNIFICANT CHANGE UP (ref 0–6)
GLUCOSE SERPL-MCNC: 91 MG/DL — SIGNIFICANT CHANGE UP (ref 70–99)
GLUCOSE UR QL: NEGATIVE MG/DL — SIGNIFICANT CHANGE UP
HCG SERPL-ACNC: <1 MIU/ML — SIGNIFICANT CHANGE UP
HCT VFR BLD CALC: 32.2 % — LOW (ref 34.5–45)
HGB BLD-MCNC: 10.5 G/DL — LOW (ref 11.5–15.5)
IANC: 13.86 K/UL — HIGH (ref 1.8–7.4)
IMM GRANULOCYTES NFR BLD AUTO: 0.5 % — SIGNIFICANT CHANGE UP (ref 0–0.9)
INR BLD: 0.95 RATIO — SIGNIFICANT CHANGE UP (ref 0.85–1.18)
KETONES UR-MCNC: NEGATIVE MG/DL — SIGNIFICANT CHANGE UP
LEUKOCYTE ESTERASE UR-ACNC: NEGATIVE — SIGNIFICANT CHANGE UP
LIDOCAIN IGE QN: 44 U/L — SIGNIFICANT CHANGE UP (ref 7–60)
LYMPHOCYTES # BLD AUTO: 1.44 K/UL — SIGNIFICANT CHANGE UP (ref 1–3.3)
LYMPHOCYTES # BLD AUTO: 8.8 % — LOW (ref 13–44)
MCHC RBC-ENTMCNC: 25.6 PG — LOW (ref 27–34)
MCHC RBC-ENTMCNC: 32.6 GM/DL — SIGNIFICANT CHANGE UP (ref 32–36)
MCV RBC AUTO: 78.5 FL — LOW (ref 80–100)
MONOCYTES # BLD AUTO: 0.83 K/UL — SIGNIFICANT CHANGE UP (ref 0–0.9)
MONOCYTES NFR BLD AUTO: 5.1 % — SIGNIFICANT CHANGE UP (ref 2–14)
NEUTROPHILS # BLD AUTO: 13.86 K/UL — HIGH (ref 1.8–7.4)
NEUTROPHILS NFR BLD AUTO: 85 % — HIGH (ref 43–77)
NITRITE UR-MCNC: NEGATIVE — SIGNIFICANT CHANGE UP
NRBC # BLD: 0 /100 WBCS — SIGNIFICANT CHANGE UP (ref 0–0)
NRBC # FLD: 0 K/UL — SIGNIFICANT CHANGE UP (ref 0–0)
PH UR: 5.5 — SIGNIFICANT CHANGE UP (ref 5–8)
PLATELET # BLD AUTO: 384 K/UL — SIGNIFICANT CHANGE UP (ref 150–400)
POTASSIUM SERPL-MCNC: 3.6 MMOL/L — SIGNIFICANT CHANGE UP (ref 3.5–5.3)
POTASSIUM SERPL-SCNC: 3.6 MMOL/L — SIGNIFICANT CHANGE UP (ref 3.5–5.3)
PROT SERPL-MCNC: 7.1 G/DL — SIGNIFICANT CHANGE UP (ref 6–8.3)
PROT UR-MCNC: SIGNIFICANT CHANGE UP MG/DL
PROTHROM AB SERPL-ACNC: 10.7 SEC — SIGNIFICANT CHANGE UP (ref 9.5–13)
RBC # BLD: 4.1 M/UL — SIGNIFICANT CHANGE UP (ref 3.8–5.2)
RBC # FLD: 15.5 % — HIGH (ref 10.3–14.5)
RBC CASTS # UR COMP ASSIST: 46 /HPF — HIGH (ref 0–4)
SODIUM SERPL-SCNC: 137 MMOL/L — SIGNIFICANT CHANGE UP (ref 135–145)
SP GR SPEC: 1.03 — SIGNIFICANT CHANGE UP (ref 1–1.03)
SQUAMOUS # UR AUTO: 4 /HPF — SIGNIFICANT CHANGE UP (ref 0–5)
UROBILINOGEN FLD QL: 0.2 MG/DL — SIGNIFICANT CHANGE UP (ref 0.2–1)
WBC # BLD: 16.3 K/UL — HIGH (ref 3.8–10.5)
WBC # FLD AUTO: 16.3 K/UL — HIGH (ref 3.8–10.5)
WBC UR QL: 4 /HPF — SIGNIFICANT CHANGE UP (ref 0–5)

## 2024-06-24 PROCEDURE — 74177 CT ABD & PELVIS W/CONTRAST: CPT | Mod: 26,MC

## 2024-06-24 PROCEDURE — 99285 EMERGENCY DEPT VISIT HI MDM: CPT

## 2024-06-24 PROCEDURE — 93010 ELECTROCARDIOGRAM REPORT: CPT

## 2024-06-24 PROCEDURE — 99282 EMERGENCY DEPT VISIT SF MDM: CPT

## 2024-06-24 RX ORDER — SODIUM CHLORIDE 9 MG/ML
1000 INJECTION INTRAMUSCULAR; INTRAVENOUS; SUBCUTANEOUS ONCE
Refills: 0 | Status: COMPLETED | OUTPATIENT
Start: 2024-06-24 | End: 2024-06-24

## 2024-06-24 RX ORDER — MORPHINE SULFATE 50 MG/1
4 CAPSULE, EXTENDED RELEASE ORAL ONCE
Refills: 0 | Status: DISCONTINUED | OUTPATIENT
Start: 2024-06-24 | End: 2024-06-24

## 2024-06-24 RX ORDER — CEFTRIAXONE 500 MG/1
1000 INJECTION, POWDER, FOR SOLUTION INTRAMUSCULAR; INTRAVENOUS ONCE
Refills: 0 | Status: COMPLETED | OUTPATIENT
Start: 2024-06-24 | End: 2024-06-24

## 2024-06-24 RX ORDER — KETOROLAC TROMETHAMINE 30 MG/ML
15 SYRINGE (ML) INJECTION ONCE
Refills: 0 | Status: DISCONTINUED | OUTPATIENT
Start: 2024-06-24 | End: 2024-06-24

## 2024-06-24 RX ADMIN — CEFTRIAXONE 100 MILLIGRAM(S): 500 INJECTION, POWDER, FOR SOLUTION INTRAMUSCULAR; INTRAVENOUS at 20:57

## 2024-06-24 RX ADMIN — MORPHINE SULFATE 4 MILLIGRAM(S): 50 CAPSULE, EXTENDED RELEASE ORAL at 20:50

## 2024-06-24 RX ADMIN — Medication 15 MILLIGRAM(S): at 20:49

## 2024-06-24 RX ADMIN — SODIUM CHLORIDE 1000 MILLILITER(S): 9 INJECTION INTRAMUSCULAR; INTRAVENOUS; SUBCUTANEOUS at 20:48

## 2024-06-24 NOTE — ED ADULT NURSE NOTE - OBJECTIVE STATEMENT
Pt. presents to intake 12 c/o severe umbilical pain that began today. Denies CP SOB n/v/d dysuria fever/chills.  RAC18G labs sent medicated per order. pending CT/ lab results Pt. presents to intake 12 c/o severe umbilical pain that began today. Denies CP SOB n/v/d dysuria fever/chills.  PMHx liver tumor & gallstones. RAC18G labs sent medicated per order. pending CT/ lab results

## 2024-06-24 NOTE — ED PROVIDER NOTE - CHIEF COMPLAINT
----- Message from Sabrina Bio sent at 1/10/2022  7:49 AM EST -----  Subject: Refill Request    QUESTIONS  Name of Medication? venlafaxine (EFFEXOR XR) 37.5 MG extended release   capsule  Patient-reported dosage and instructions? 37.5 mg / daily  How many days do you have left? 1  Preferred Pharmacy? [id number]  Pharmacy phone number (if available)? [phone]  Additional Information for Provider? Urgent low on meds, please fill asap   ---------------------------------------------------------------------------  --------------  CALL BACK INFO  What is the best way for the office to contact you? OK to leave message on   voicemail  Preferred Call Back Phone Number?  [phone] The patient is a 43y Female complaining of abdominal pain.

## 2024-06-24 NOTE — ED PROVIDER NOTE - PATIENT PORTAL LINK FT
You can access the FollowMyHealth Patient Portal offered by [facility] by registering at the following website: [email or web address]. By joining [employer]’s FollowMyHealth portal, you will also be able to view your health information using other applications (apps) compatible with our system.

## 2024-06-24 NOTE — ED PROVIDER NOTE - CLINICAL SUMMARY MEDICAL DECISION MAKING FREE TEXT BOX
Patient with abdominal pain sudden onset today.  No other associated symptoms.  Differential diagnosis includes, but not limited to, appendicitis, colitis, nephrolithiasis, UTI.  Plan: Labs, CT, symptom relief, reassess.

## 2024-06-24 NOTE — ED PROVIDER NOTE - PHYSICAL EXAMINATION
GEN: no acute respiratory distress. nontoxic, speaking comfortably in full sentences, ambulating with steady gait.  HEENT: NCAT. face symmetrical. PERRL 4mm, EOMI, MMM, oropharynx wnl.  Neck: no JVD, trachea midline, no lymphadenopathy, FROM  CV: RRR. +S1S2, no murmur. 2+ pulses in 4 extremities, cap refill <2 sec  Chest: CTA B/l. no wheezing, rales, rhonchi. no retractions. good air movement.   ABD: +BS, soft, non distended, diffusely tender but greatest in RLQ. No guarding. +rebound. + rovsings +psoas sign + obturator sign.  No lesions, ecchymosis, well healed surgical scar  : no cva or suprapubic tenderness  MSK: No clubbing, cyanosis, edema. FROM of all extremities. no tenderness to palpation. No midline or paraspinal tenderness.   Neuro: AAOX3.  Sensation intact, motor 5/5 throughout.   SKIN: No rash

## 2024-06-24 NOTE — ED PROVIDER NOTE - OBJECTIVE STATEMENT
43-year-old female past medical history liver mass status postresection,?  Cholecystectomy.  Presents for lower abdominal pain since 2 PM.  Patient reports initially started periumbilically and right lower quadrant.  Pain most intense in right lower quadrant now but reports feeling it diffusely.  She denies fevers, chills, chest pain, shortness of breath.  Patient denies back pain, dysuria, hematuria.  Last bowel movement this morning approximately 9 AM was normal.  She denies drugs, alcohol or tobacco.  Last ate at 5 PM.  menses started today

## 2024-06-24 NOTE — ED ADULT TRIAGE NOTE - CHIEF COMPLAINT QUOTE
c/o abd pain x 1 hour associated with nausea. Denies CP, SOB, fevers, chills, diarrhea states " I thought they took out my appendix but now I really don't know"  hx liver tumor removal, gallstones

## 2024-06-24 NOTE — ED PROVIDER NOTE - NSFOLLOWUPINSTRUCTIONS_ED_ALL_ED_FT
You were seen in the emergency department for abdominal pain.  You had blood work, a CT scan, and an ultrasound done.  You were found to have a mass in your uterus.  You will need to follow-up with the gynecologist for further testing.  You were given antibiotics for possible pelvic inflammatory disease - take DOXYCYCLINE 100MG TWICE A DAY and METRONIDAZOLE 500MG TWICE A DAY for 14 days total.    Drink plenty of fluids.  You can take ibuprofen 600mg every 6 hours or Tylenol 650mg every 4 hours as needed for pain or fever.  Follow-up at the [facility] clinic in 1 week - call [phone] to schedule your appointment.  Return to the emergency department for any new or worsening symptoms.

## 2024-06-24 NOTE — ED ADULT NURSE NOTE - IN ACCORDANCE WITH [place] STATE LAW, WE OFFER EVERY PATIENT A HEPATITIS C TEST. WOULD YOU LIKE TO BE TESTED TODAY?
[No focal deficits] : no focal deficits [Normal] : affect appropriate [90: Minor restrictions in physically strenuous activity.] : 90: Minor restrictions in physically strenuous activity. [Ulcers] : no ulcers [Mucositis] : no mucositis [Thrush] : no thrush [Vesicles] : no vesicles [de-identified] : Small for age [de-identified] : SNH loss wears hearing aid [de-identified] : S1/S2 present. Grade 2/6 RUTH at B.  Opt out

## 2024-06-24 NOTE — ED PROVIDER NOTE - PROGRESS NOTE DETAILS
Christiano CARTY: Pt signed out to me.  Pt reassessed and is feeling better. I independently reviewed the labs and/or imaging results, and there are no emergent findings.  She has been evaluated by GYN, who recommend PID treatment and outpatient clinic follow-up.  Pt is stable for discharge and outpatient follow-up.

## 2024-06-25 VITALS
TEMPERATURE: 98 F | DIASTOLIC BLOOD PRESSURE: 70 MMHG | RESPIRATION RATE: 18 BRPM | OXYGEN SATURATION: 99 % | SYSTOLIC BLOOD PRESSURE: 109 MMHG | HEART RATE: 80 BPM

## 2024-06-25 PROBLEM — N92.0 EXCESSIVE AND FREQUENT MENSTRUATION WITH REGULAR CYCLE: Chronic | Status: ACTIVE | Noted: 2018-12-15

## 2024-06-25 PROCEDURE — 76830 TRANSVAGINAL US NON-OB: CPT | Mod: 26

## 2024-06-25 RX ORDER — CEFTRIAXONE 500 MG/1
500 INJECTION, POWDER, FOR SOLUTION INTRAMUSCULAR; INTRAVENOUS ONCE
Refills: 0 | Status: COMPLETED | OUTPATIENT
Start: 2024-06-25 | End: 2024-06-25

## 2024-06-25 RX ORDER — METRONIDAZOLE 500 MG
1 TABLET ORAL
Qty: 28 | Refills: 0
Start: 2024-06-25 | End: 2024-07-08

## 2024-06-25 RX ORDER — METRONIDAZOLE 500 MG
500 TABLET ORAL ONCE
Refills: 0 | Status: COMPLETED | OUTPATIENT
Start: 2024-06-25 | End: 2024-06-25

## 2024-06-25 RX ORDER — MORPHINE SULFATE 50 MG/1
4 CAPSULE, EXTENDED RELEASE ORAL ONCE
Refills: 0 | Status: DISCONTINUED | OUTPATIENT
Start: 2024-06-25 | End: 2024-06-25

## 2024-06-25 RX ORDER — KETOROLAC TROMETHAMINE 30 MG/ML
15 SYRINGE (ML) INJECTION ONCE
Refills: 0 | Status: DISCONTINUED | OUTPATIENT
Start: 2024-06-25 | End: 2024-06-25

## 2024-06-25 RX ADMIN — Medication 15 MILLIGRAM(S): at 00:30

## 2024-06-25 RX ADMIN — CEFTRIAXONE 500 MILLIGRAM(S): 500 INJECTION, POWDER, FOR SOLUTION INTRAMUSCULAR; INTRAVENOUS at 06:39

## 2024-06-25 RX ADMIN — Medication 500 MILLIGRAM(S): at 06:38

## 2024-06-25 RX ADMIN — MORPHINE SULFATE 4 MILLIGRAM(S): 50 CAPSULE, EXTENDED RELEASE ORAL at 00:31

## 2024-06-25 RX ADMIN — Medication 100 MILLIGRAM(S): at 06:39

## 2024-06-25 NOTE — CONSULT NOTE ADULT - ASSESSMENT
***Incomplete Note*** 43y G0 LMP 6/24 presenting with acute onset RLQ pain. Patient with leukocytosis to 16.30 with evidence of RLQ pain and cervical motion tenderness in the setting of unprotected sex. Limited ability to evaluate for cervical discharge due to active menses. Patient reports hx of appendectomy with no appendix visualized on CT, limiting concern of appendicitis. Ovaries wnl on TVUS, limiting concern for ovarian torsion and no evidence of TOA or other pelvic mass or collection.  Given the physical exam and leukocytosis, cannot rule out PID. In the setting of pelvic tenderness, will treat for PID. Given the acute onset of pain and no history of pain in the setting of menses, unlikely related to endometrial mass or possible degenerating fibroid.        Recommendations:     - Send Urine G/C   - F/u cervical G/C swab and bacterial vaginal cultures collected by Gyn Team   - Antibiotic  treatment with Ceftriaxone 500mg IM prior to discharge,   continue Doxycyline 100mg bid for 14 days and Metronidazole 500mg bid for 14 days   - F/u in [facility] in 1 week  - Return precautions: worsening pain, fever, chills, nausea, vomiting   - Recommend outpatient f/u for further work-up of endometrial mass seen on imaging       Seen and D/w Dr. Lance Edmonds, PGY2    Lor Edmonds, PGY2

## 2024-06-25 NOTE — CONSULT NOTE ADULT - SUBJECTIVE AND OBJECTIVE BOX
***Incomplete Note***    MARNIE RIVERO  43y  Female [id number]    HPI:      Name of GYN Physician:     ObHx:  GynHx: Denies fibroids, cysts, endometriosis, STI's, Abnormal pap smears   PMHx:  SurgHx:  Meds:  Allergies:  Social History:  Denies smoking use, drug use, alcohol use.    Vital Signs Last 24 Hrs  T(C): 36.8 (2024 00:33), Max: 36.8 (2024 19:06)  T(F): 98.3 (2024 00:33), Max: 98.3 (2024 00:33)  HR: 87 (2024 00:33) (87 - 94)  BP: 108/63 (2024 00:33) (108/63 - 125/85)  BP(mean): --  RR: 18 (2024 00:33) (16 - 18)  SpO2: 100% (2024 00:33) (97% - 100%)    Parameters below as of 2024 00:33  Patient On (Oxygen Delivery Method): room air        Physical Exam:   General: sitting comfortably in bed, NAD   HEENT: neck supple, full ROM  CV: RR S1S2 no m/r/g  Lungs: CTA b/l, good air flow b/l   Back: No CVA tenderness  Abd: Soft, non-tender, non-distended.  Bowel sounds present.    :  No bleeding on pad. External labia wnl. Bimanual exam with no cervical motion tenderness, uterus wnl, adnexa non palpable b/l.  Cervix closed vs. Cervix dilated *** cm   Speculum Exam: No active bleeding from os. Physiologic discharge.    Ext: non-tender b/l, no edema     Chaperoned by     LABS:                              10.5   16.30 )-----------( 384      ( 2024 20:45 )             32.2     06-    137  |  104  |  17  ----------------------------<  91  3.6   |  20<L>  |  0.50    Ca    9.0      2024 21:17    TPro  7.1  /  Alb  4.1  /  TBili  <0.2  /  DBili  x   /  AST  19  /  ALT  17  /  AlkPhos  118  06-24    I&O's Detail    PT/INR - ( 2024 21:31 )   PT: 10.7 sec;   INR: 0.95 ratio         PTT - ( 2024 21:31 )  PTT:31.2 sec  Urinalysis Basic - ( 2024 21:43 )    Color: Yellow / Appearance: Clear / S.027 / pH: x  Gluc: x / Ketone: Negative mg/dL  / Bili: Negative / Urobili: 0.2 mg/dL   Blood: x / Protein: Trace mg/dL / Nitrite: Negative   Leuk Esterase: Negative / RBC: 46 /HPF / WBC 4 /HPF   Sq Epi: x / Non Sq Epi: 4 /HPF / Bacteria: Occasional /HPF        RADIOLOGY & ADDITIONAL STUDIES: MARNIE RIVERO  43y  Female [id number]    HPI: 43y G0 LMP  with PMSHx of Appendectomy ', Resection liver mass (not followed, per patient benign) (), Thyroid surgery presenting with acute onset RLQ pain. Patient was previously evaluated for pain in 2018 and at that time had imaging which showed endometrial mass with possibility of degenerating fibroid. Patient had not further workup of mass at that time.  Patient notes pain started at 6p with a sharp pain constant pain with associated nausea but no episodes of vomiting. Patient denies previous history of this specific pain. She denies dizziness, lightheadness, SOB or chest pain associated. She denies fevers or chills. Patient does note regular heavy periods with bleeding for 8-15 days a month, on heavy days using 15 pads a day. She denies pain associated with her heavy menses. Patient is currently sexual active with her , denies use of contraception.         Name of GYN Physician: No gynecologist, patient follow with Dr. Church (family medicine) for gyn care.     ObHx: G0  GynHx: Denies, cysts, endometriosis, STI's, Abnormal pap smears   PMHx: Denies current, scheduled for breast biopsies later this week   SurgHx: Appendectomy ', Resection liver mass (not followed, per patient benign) (), Thyroid surgery   Meds: Denies   Allergies: Denies     Vital Signs Last 24 Hrs  T(C): 36.8 (2024 00:33), Max: 36.8 (2024 19:06)  T(F): 98.3 (2024 00:33), Max: 98.3 (2024 00:33)  HR: 87 (2024 00:33) (87 - 94)  BP: 108/63 (2024 00:33) (108/63 - 125/85)  RR: 18 (2024 00:33) (16 - 18)  SpO2: 100% (2024 00:33) (97% - 100%)    Parameters below as of 2024 00:33  Patient On (Oxygen Delivery Method): room air        Physical Exam:   General: sitting comfortably in bed, NAD   Back: No CVA tenderness  Abd: RLQ pain, soft, non-distended   :  Bleeding on pad  External labia wnl. Bimanual exam with cervical motion tenderness, and fundal tenderness, and R adnexal tenderness no palpable adnexal masses   Speculum Exam: 5cc blood in the vault with no active bleeding from os. No noted green, yellow or foul smelling discharge, however limited due to blood in the vault due to menses     Chaperoned by ARSLAN Rice     LABS:                              10.5   16.30 )-----------( 384      ( 2024 20:45 )             32.2         137  |  104  |  17  ----------------------------<  91  3.6   |  20<L>  |  0.50    Ca    9.0      2024 21:17    TPro  7.1  /  Alb  4.1  /  TBili  <0.2  /  DBili  x   /  AST  19  /  ALT  17  /  AlkPhos  118      I&O's Detail    PT/INR - ( 2024 21:31 )   PT: 10.7 sec;   INR: 0.95 ratio         PTT - ( 2024 21:31 )  PTT:31.2 sec  Urinalysis Basic - ( 2024 21:43 )    Color: Yellow / Appearance: Clear / S.027 / pH: x  Gluc: x / Ketone: Negative mg/dL  / Bili: Negative / Urobili: 0.2 mg/dL   Blood: x / Protein: Trace mg/dL / Nitrite: Negative   Leuk Esterase: Negative / RBC: 46 /HPF / WBC 4 /HPF   Sq Epi: x / Non Sq Epi: 4 /HPF / Bacteria: Occasional /HPF        RADIOLOGY & ADDITIONAL STUDIES:    < from: US Transvaginal (24 @ 01:13) >  ACC: [id number] EXAM:  US TRANSVAGINAL   ORDERED BY: KENDALL LEPE     PROCEDURE DATE:  2024          INTERPRETATION:  CLINICAL INFORMATION: Right lower quadrant pain.   Endometrial mass.    LMP: 2024.    COMPARISON: CT abdomen and pelvis 2024. Ultrasound pelvis 2018.    TECHNIQUE:  Endovaginal and transabdominal pelvic sonogram. Color and Spectral   Doppler was performed.    FINDINGS:  Uterus: 9.7 cm x 6.5 cm x 6.6 cm. Within normal limits.  Endometrium: 32 mm. 3.4 x 2.8x 3.3 cm endometrial mass suggestive of an   intracavitary fibroid. This has enlarged since the previous exam of   2018 and remains indeterminant. Further evaluation with   contrast-enhanced pelvic MRI and/or tissue sampling can be made as   clinically indicated.    Right ovary: 1.5 cm x 2.2 cm x 1.1 cm. Within normal limits. Normal   arterial and venous waveforms.  Left ovary: 2.3 cm x 1.3 cm x 1.8 cm. Within normal limits. Normal   arterial and venous waveforms.    Fluid: None.    IMPRESSION:    3.4 x 2.8 x 3.3 cm endometrial mass suggestive of an intracavitary   fibroid. This has enlarged since the previous exam of 2018 and   remains indeterminant. Further evaluation with contrast-enhanced pelvic   MRI and/or tissue sampling can be made as clinically indicated.        --- End of Report ---            KAYCE ADEN MD; Attending Radiologist  This document has been electronically signed. 2024  1:19AM    < end of copied text >  < from: CT Abdomen and Pelvis w/ IV Cont (24 @ 23:16) >  ACC: [id number] EXAM:  CT ABDOMEN AND PELVIS IC   ORDERED BY: KENDALL LEPE     PROCEDURE DATE:  2024          INTERPRETATION:  CLINICAL INFORMATION: Right lower abdominal pain    COMPARISON: CT abdomen and pelvis 2018. Pelvic ultrasound   2018.    CONTRAST/COMPLICATIONS:  IV Contrast: Omnipaque 350  80 cc administered   20 cc discarded  Oral Contrast: NONE  Complications: None reported at time of study completion    PROCEDURE:  CT of the Abdomen and Pelvis was performed.  Sagittal and coronal reformats were performed.    FINDINGS:  LOWER CHEST: Within normal limits.    LIVER: Subcentimeter hypodensity too small to characterize.  BILE DUCTS: Normal caliber.  GALLBLADDER: Not visualized.  SPLEEN: Within normal limits.  PANCREAS: Within normal limits.  ADRENALS: Within normal limits.  KIDNEYS/URETERS: Punctate nonobstructive right-sided calculus    BLADDER: Within normal limits.  REPRODUCTIVE ORGANS: Prominent endometrium. A 4.3 x 2.3 cm enhancing mass   in the endometrial canal, possibly an intracavitary fibroid versus   endometrial polyp or neoplasm. Previously lanced abscess and smaller   measuring 2.2 x 2.0 cm on the CT from 2018.    BOWEL: No bowel obstruction. Appendix is not visualized. No evidence of  inflammation in the pericecal region. Small periampullary/duodenal   diverticulum.  PERITONEUM/RETROPERITONEUM: Within normal limits.  VESSELS: Within normal limits.  LYMPH NODES: No lymphadenopathy.  ABDOMINAL WALL: Small fat-containing umbilical hernia.  BONES: Within normal limits.    IMPRESSION:  Mass in the uterine endometrial canal as described above increased in   size and density since 2018. Recommend contrast-enhanced pelvic MRI   for further evaluation.    No acute findings in the abdomen and pelvis otherwise.        --- End of Report ---          ARIA CASTRO MD; Resident Radiologist  This document has been electronically signed.  KAYCE ADEN MD; Attending Radiologist  This document has been electronically signed. 2024 12:00AM    < end of copied text >      #: [id number], [id number]    MARNIE RIVERO  43y  Female [id number]    HPI: 43y G0 LMP  with PMSHx of Appendectomy ', Resection liver mass (not followed, per patient benign) (), Thyroid surgery presenting with acute onset RLQ pain. Patient was previously evaluated for pain in 2018 and at that time had imaging which showed endometrial mass with possibility of degenerating fibroid. Patient had not further workup of mass at that time.  Patient notes pain started at 6p with a sharp pain constant pain with associated nausea but no episodes of vomiting. Patient denies previous history of this specific pain. She denies dizziness, lightheadness, SOB or chest pain associated. She denies fevers or chills. Patient does note regular heavy periods with bleeding for 8-15 days a month, on heavy days using 15 pads a day. She denies pain associated with her heavy menses. Patient is currently sexual active with her , denies use of contraception.         Name of GYN Physician: No gynecologist, patient follow with Dr. Church (family medicine) for gyn care.     ObHx: G0  GynHx: Denies, cysts, endometriosis, STI's, Abnormal pap smears   PMHx: Denies current, scheduled for breast biopsies later this week   SurgHx: Appendectomy ', Resection liver mass (not followed, per patient benign) (), Thyroid surgery   Meds: Denies   Allergies: Denies     Vital Signs Last 24 Hrs  T(C): 36.8 (2024 00:33), Max: 36.8 (2024 19:06)  T(F): 98.3 (2024 00:33), Max: 98.3 (2024 00:33)  HR: 87 (2024 00:33) (87 - 94)  BP: 108/63 (2024 00:33) (108/63 - 125/85)  RR: 18 (2024 00:33) (16 - 18)  SpO2: 100% (2024 00:33) (97% - 100%)    Parameters below as of 2024 00:33  Patient On (Oxygen Delivery Method): room air        Physical Exam:   General: sitting comfortably in bed, NAD   Back: No CVA tenderness  Abd: RLQ pain, soft, non-distended   :  Bleeding on pad  External labia wnl. Bimanual exam with cervical motion tenderness, and fundal tenderness, and R adnexal tenderness no palpable adnexal masses   Speculum Exam: 5cc blood in the vault with no active bleeding from os. No noted green, yellow or foul smelling discharge, however limited due to blood in the vault due to menses     Chaperoned by ARSLAN Rice     LABS:                              10.5   16.30 )-----------( 384      ( 2024 20:45 )             32.2         137  |  104  |  17  ----------------------------<  91  3.6   |  20<L>  |  0.50    Ca    9.0      2024 21:17    TPro  7.1  /  Alb  4.1  /  TBili  <0.2  /  DBili  x   /  AST  19  /  ALT  17  /  AlkPhos  118      I&O's Detail    PT/INR - ( 2024 21:31 )   PT: 10.7 sec;   INR: 0.95 ratio         PTT - ( 2024 21:31 )  PTT:31.2 sec  Urinalysis Basic - ( 2024 21:43 )    Color: Yellow / Appearance: Clear / S.027 / pH: x  Gluc: x / Ketone: Negative mg/dL  / Bili: Negative / Urobili: 0.2 mg/dL   Blood: x / Protein: Trace mg/dL / Nitrite: Negative   Leuk Esterase: Negative / RBC: 46 /HPF / WBC 4 /HPF   Sq Epi: x / Non Sq Epi: 4 /HPF / Bacteria: Occasional /HPF        RADIOLOGY & ADDITIONAL STUDIES:    < from: US Transvaginal (24 @ 01:13) >  ACC: [id number] EXAM:  US TRANSVAGINAL   ORDERED BY: KENDALL LEPE     PROCEDURE DATE:  2024          INTERPRETATION:  CLINICAL INFORMATION: Right lower quadrant pain.   Endometrial mass.    LMP: 2024.    COMPARISON: CT abdomen and pelvis 2024. Ultrasound pelvis 2018.    TECHNIQUE:  Endovaginal and transabdominal pelvic sonogram. Color and Spectral   Doppler was performed.    FINDINGS:  Uterus: 9.7 cm x 6.5 cm x 6.6 cm. Within normal limits.  Endometrium: 32 mm. 3.4 x 2.8x 3.3 cm endometrial mass suggestive of an   intracavitary fibroid. This has enlarged since the previous exam of   2018 and remains indeterminant. Further evaluation with   contrast-enhanced pelvic MRI and/or tissue sampling can be made as   clinically indicated.    Right ovary: 1.5 cm x 2.2 cm x 1.1 cm. Within normal limits. Normal   arterial and venous waveforms.  Left ovary: 2.3 cm x 1.3 cm x 1.8 cm. Within normal limits. Normal   arterial and venous waveforms.    Fluid: None.    IMPRESSION:    3.4 x 2.8 x 3.3 cm endometrial mass suggestive of an intracavitary   fibroid. This has enlarged since the previous exam of 2018 and   remains indeterminant. Further evaluation with contrast-enhanced pelvic   MRI and/or tissue sampling can be made as clinically indicated.        --- End of Report ---            KAYCE ADEN MD; Attending Radiologist  This document has been electronically signed. 2024  1:19AM    < end of copied text >  < from: CT Abdomen and Pelvis w/ IV Cont (24 @ 23:16) >  ACC: [id number] EXAM:  CT ABDOMEN AND PELVIS IC   ORDERED BY: KENDALL LEPE     PROCEDURE DATE:  2024          INTERPRETATION:  CLINICAL INFORMATION: Right lower abdominal pain    COMPARISON: CT abdomen and pelvis 2018. Pelvic ultrasound   2018.    CONTRAST/COMPLICATIONS:  IV Contrast: Omnipaque 350  80 cc administered   20 cc discarded  Oral Contrast: NONE  Complications: None reported at time of study completion    PROCEDURE:  CT of the Abdomen and Pelvis was performed.  Sagittal and coronal reformats were performed.    FINDINGS:  LOWER CHEST: Within normal limits.    LIVER: Subcentimeter hypodensity too small to characterize.  BILE DUCTS: Normal caliber.  GALLBLADDER: Not visualized.  SPLEEN: Within normal limits.  PANCREAS: Within normal limits.  ADRENALS: Within normal limits.  KIDNEYS/URETERS: Punctate nonobstructive right-sided calculus    BLADDER: Within normal limits.  REPRODUCTIVE ORGANS: Prominent endometrium. A 4.3 x 2.3 cm enhancing mass   in the endometrial canal, possibly an intracavitary fibroid versus   endometrial polyp or neoplasm. Previously lanced abscess and smaller   measuring 2.2 x 2.0 cm on the CT from 2018.    BOWEL: No bowel obstruction. Appendix is not visualized. No evidence of  inflammation in the pericecal region. Small periampullary/duodenal   diverticulum.  PERITONEUM/RETROPERITONEUM: Within normal limits.  VESSELS: Within normal limits.  LYMPH NODES: No lymphadenopathy.  ABDOMINAL WALL: Small fat-containing umbilical hernia.  BONES: Within normal limits.    IMPRESSION:  Mass in the uterine endometrial canal as described above increased in   size and density since 2018. Recommend contrast-enhanced pelvic MRI   for further evaluation.    No acute findings in the abdomen and pelvis otherwise.        --- End of Report ---          ARIA CASTRO MD; Resident Radiologist  This document has been electronically signed.  KAYCE ADEN MD; Attending Radiologist  This document has been electronically signed. 2024 12:00AM    < end of copied text >

## 2024-06-25 NOTE — CONSULT NOTE ADULT - ATTENDING COMMENTS
Pt seen and examined.  Pt p/w RLQ pain, USN findings only remarkable for enlarging intracavitary fibroid, no adnexal masses appreciated.  On exam tender to palpation RLQ, no rebound, no guarding.  On resident exam +CMT, elevated WBC.  Recommend screen for Gc/ Chlamydia.    Pt reports hx of appendectomy.  Recommend treatment for PID at this time with ceftriaxone, doxycycline, and metronidazole.  Pt states she does not have insurance.  Recommend follow up in GYN clinic in 1 week.  Return to hospital if worsening symptoms or unable to tolerate PO meds.     Katty Lucas MD

## 2024-06-26 LAB
C TRACH RRNA SPEC QL NAA+PROBE: SIGNIFICANT CHANGE UP
C TRACH RRNA SPEC QL NAA+PROBE: SIGNIFICANT CHANGE UP
CULTURE RESULTS: SIGNIFICANT CHANGE UP
N GONORRHOEA RRNA SPEC QL NAA+PROBE: SIGNIFICANT CHANGE UP
N GONORRHOEA RRNA SPEC QL NAA+PROBE: SIGNIFICANT CHANGE UP
SPECIMEN SOURCE: SIGNIFICANT CHANGE UP
SPECIMEN SOURCE: SIGNIFICANT CHANGE UP
T VAGINALIS RRNA SPEC QL NAA+PROBE: SIGNIFICANT CHANGE UP

## 2024-06-28 LAB
CULTURE RESULTS: SIGNIFICANT CHANGE UP
SPECIMEN SOURCE: SIGNIFICANT CHANGE UP

## 2024-07-01 PROBLEM — Z00.00 ENCOUNTER FOR PREVENTIVE HEALTH EXAMINATION: Status: ACTIVE | Noted: 2024-07-01

## 2024-07-23 ENCOUNTER — RESULT REVIEW (OUTPATIENT)
Age: 44
End: 2024-07-23

## 2024-07-23 ENCOUNTER — APPOINTMENT (OUTPATIENT)
Dept: OBGYN | Facility: HOSPITAL | Age: 44
End: 2024-07-23
Payer: SELF-PAY

## 2024-07-23 VITALS
DIASTOLIC BLOOD PRESSURE: 70 MMHG | WEIGHT: 127.5 LBS | SYSTOLIC BLOOD PRESSURE: 119 MMHG | TEMPERATURE: 98.1 F | HEART RATE: 95 BPM | BODY MASS INDEX: 28.68 KG/M2 | HEIGHT: 56 IN

## 2024-07-23 DIAGNOSIS — Z01.419 ENCOUNTER FOR GYNECOLOGICAL EXAMINATION (GENERAL) (ROUTINE) W/OUT ABNORMAL FINDINGS: ICD-10-CM

## 2024-07-23 DIAGNOSIS — R10.2 PELVIC AND PERINEAL PAIN: ICD-10-CM

## 2024-07-23 DIAGNOSIS — Z11.3 ENCOUNTER FOR SCREENING FOR INFECTIONS WITH A PREDOMINANTLY SEXUAL MODE OF TRANSMISSION: ICD-10-CM

## 2024-07-23 DIAGNOSIS — K76.89 OTHER SPECIFIED DISEASES OF LIVER: ICD-10-CM

## 2024-07-23 LAB
HCG UR QL: NEGATIVE
QUALITY CONTROL: YES

## 2024-07-23 PROCEDURE — 99213 OFFICE O/P EST LOW 20 MIN: CPT | Mod: GC,25

## 2024-07-23 PROCEDURE — 99396 PREV VISIT EST AGE 40-64: CPT | Mod: 25

## 2024-07-24 NOTE — HISTORY OF PRESENT ILLNESS
[FreeTextEntry1] : Pt is a 42yo  LMP 2024 who presents for hospital f/u. She presented to the ED on 24 for RLQ abdominal pain. TVUS revealed 3.4 x 2.8 x 3.3 cm endometrial mass suggestive of an intracavitary fibroid, consistent with CT results from same visit. A previous TVUS from 2018 noted the size of the fibroid to be 2.8 x 2.5 x 2.4 cm. She was discharged with a 2-week course of doxycycline and metronidazole, which pt states improved her pain.  Pt states she has the right lower pelvic pain again today, and that she only has the pain when she is menstruating. This is her second menstrual period with pain (first being last month when she presented to the ED).  She denies fever, chills, nausea, vomiting, chest pain, difficulty breathing, epigastric pain, upper abdominal pain, dysuria, hematuria, urinary urgency or frequency, vaginal pain or discharge.  OBHx: , all vaginal full-term deliveries, no pregnancy or PP complications GYNHx: -Menstrual: LMP 24, regular menstrual periods q30 days, lasts 8 days, regular flow, no intermenstrual bleeding -STIs: denies -Contraception: tubal ligation performed 2019 -Sexually active with , no other partners -Pap: normal 2024 per pt -Mammo: , pt states she will be getting a breast biopsy on 24 for 2 lumps in right breast   [PapSmeardate] : 06/24

## 2024-07-24 NOTE — DISCUSSION/SUMMARY
[FreeTextEntry1] : Pt is 42yo  LMP 24 who presents for ED f/u for right lower pelvic pain during menstruation. Imaging from last month reveals 3.4 x 2.8 x 3.3 cm fibroid that has increased in size since previous imaging in 2018. Pelvic exam is notable for menstrual blood in vaginal canal and tenderness on bimanual exam. Pt stable.  #Right pelvic pain - Pain during menstruation likely secondary to fibroid - Ordered STI panel, CBC - Pap and GC/CT culture collected - Ordered MR abdomen/pelvis w & wo contrast - RTC 1 month to review results - Consider EMB in the future if symptoms persist  #Health care maintenance - Pap/HPV collected today - Gardasil #1 today - RTC 2 months for Gardasil #2  Discussed with Dr. Grace, attending. Kalee Larson, PGY-1

## 2024-07-24 NOTE — HISTORY OF PRESENT ILLNESS
[FreeTextEntry1] : Pt is a 44yo  LMP 2024 who presents for hospital f/u. She presented to the ED on 24 for RLQ abdominal pain. TVUS revealed 3.4 x 2.8 x 3.3 cm endometrial mass suggestive of an intracavitary fibroid, consistent with CT results from same visit. A previous TVUS from 2018 noted the size of the fibroid to be 2.8 x 2.5 x 2.4 cm. She was discharged with a 2-week course of doxycycline and metronidazole, which pt states improved her pain.  Pt states she has the right lower pelvic pain again today, and that she only has the pain when she is menstruating. This is her second menstrual period with pain (first being last month when she presented to the ED).  She denies fever, chills, nausea, vomiting, chest pain, difficulty breathing, epigastric pain, upper abdominal pain, dysuria, hematuria, urinary urgency or frequency, vaginal pain or discharge.  OBHx: , all vaginal full-term deliveries, no pregnancy or PP complications GYNHx: -Menstrual: LMP 24, regular menstrual periods q30 days, lasts 8 days, regular flow, no intermenstrual bleeding -STIs: denies -Contraception: tubal ligation performed 2019 -Sexually active with , no other partners -Pap: normal 2024 per pt -Mammo: , pt states she will be getting a breast biopsy on 24 for 2 lumps in right breast   [PapSmeardate] : 06/24

## 2024-07-24 NOTE — PHYSICAL EXAM
[Chaperone Present] : A chaperone was present in the examining room during all aspects of the physical examination [Appropriately responsive] : appropriately responsive [Alert] : alert [No Acute Distress] : no acute distress [Non-distended] : non-distended [Oriented x3] : oriented x3 [Labia Majora] : normal [Labia Minora] : normal [Moderate] : There was moderate vaginal bleeding [Normal] : normal [Soft] : soft [Normal Position] : in a normal position [Tenderness] : tender [Uterine Adnexae] : normal [FreeTextEntry2] : Ashley [FreeTextEntry7] : tender to palpation in RLQ [FreeTextEntry4] : menstrual blood in vaginal canal [FreeTextEntry5] : menstrual blood at os [FreeTextEntry8] : Uterus tender to palpation on right, no mass palpated

## 2024-07-25 DIAGNOSIS — D64.9 ANEMIA, UNSPECIFIED: ICD-10-CM

## 2024-07-25 RX ORDER — FERROUS SULFATE TAB EC 325 MG (65 MG FE EQUIVALENT) 325 (65 FE) MG
325 (65 FE) TABLET DELAYED RESPONSE ORAL DAILY
Qty: 90 | Refills: 2 | Status: ACTIVE | COMMUNITY
Start: 2024-07-25 | End: 1900-01-01

## 2024-07-30 ENCOUNTER — NON-APPOINTMENT (OUTPATIENT)
Age: 44
End: 2024-07-30

## 2024-08-27 ENCOUNTER — APPOINTMENT (OUTPATIENT)
Dept: MRI IMAGING | Facility: CLINIC | Age: 44
End: 2024-08-27
Payer: COMMERCIAL

## 2024-08-27 PROCEDURE — A9585: CPT | Mod: JW

## 2024-08-27 PROCEDURE — 72197 MRI PELVIS W/O & W/DYE: CPT

## 2024-08-27 PROCEDURE — 74183 MRI ABD W/O CNTR FLWD CNTR: CPT

## 2024-09-04 ENCOUNTER — OUTPATIENT (OUTPATIENT)
Dept: OUTPATIENT SERVICES | Facility: HOSPITAL | Age: 44
LOS: 1 days | End: 2024-09-04

## 2024-09-04 ENCOUNTER — APPOINTMENT (OUTPATIENT)
Dept: OBGYN | Facility: HOSPITAL | Age: 44
End: 2024-09-04
Payer: COMMERCIAL

## 2024-09-04 VITALS
BODY MASS INDEX: 28.79 KG/M2 | HEART RATE: 70 BPM | HEIGHT: 56 IN | TEMPERATURE: 97.5 F | SYSTOLIC BLOOD PRESSURE: 125 MMHG | DIASTOLIC BLOOD PRESSURE: 65 MMHG | WEIGHT: 128 LBS

## 2024-09-04 DIAGNOSIS — D25.9 LEIOMYOMA OF UTERUS, UNSPECIFIED: ICD-10-CM

## 2024-09-04 DIAGNOSIS — D64.9 ANEMIA, UNSPECIFIED: ICD-10-CM

## 2024-09-04 DIAGNOSIS — Z00.00 ENCOUNTER FOR GENERAL ADULT MEDICAL EXAMINATION W/OUT ABNORMAL FINDINGS: ICD-10-CM

## 2024-09-04 PROCEDURE — 99213 OFFICE O/P EST LOW 20 MIN: CPT | Mod: GE

## 2024-09-05 DIAGNOSIS — R10.2 PELVIC AND PERINEAL PAIN: ICD-10-CM

## 2024-09-05 DIAGNOSIS — D25.9 LEIOMYOMA OF UTERUS, UNSPECIFIED: ICD-10-CM

## 2024-09-05 DIAGNOSIS — D64.9 ANEMIA, UNSPECIFIED: ICD-10-CM

## 2024-09-05 LAB
HCG UR QL: NEGATIVE
QUALITY CONTROL: YES

## 2024-09-05 NOTE — HISTORY OF PRESENT ILLNESS
[FreeTextEntry1] : Pt is a 42yo  (LMP 24) who presents for f/u to discuss MR results of fibroid. Today, patient has no complaints. During last period, she had similar RLQ pain that improved with Tylenol. She denies fever, chills, nausea, vomiting, chest pain, difficulty breathing, epigastric pain, upper abdominal pain, dysuria, hematuria, urinary urgency or frequency, vaginal pain or discharge.  She presented to the ED on 24 for RLQ abdominal pain. TVUS revealed 3.4 x 2.8 x 3.3 cm endometrial mass suggestive of an intracavitary fibroid, consistent with CT results from same visit. A previous TVUS from 2018 noted the size of the fibroid to be 2.8 x 2.5 x 2.4 cm. She was discharged with a 2-week course of doxycycline and metronidazole, which pt states improved her pain.   OBHx: , all vaginal full-term deliveries, no pregnancy or PP complications GYNHx: -Menstrual: LMP 24, Regular menstrual periods q30 days, lasts 8 days, regular flow, no intermenstrual bleeding -STIs: denies -Contraception: tubal ligation performed 2019 -Sexually active with , no other partners -Pap: normal 2024 per pt -Mammo: , pt states she will be getting a breast biopsy on 24 for 2 lumps in right breast   EXAM: [id number] - MR ABDOMEN WAW IC  - ORDERED BY: TAMMY ORTIZ  EXAM: [id number] - MR PELVIS WAW IC  - ORDERED BY: TAMMY ORTIZ   PROCEDURE DATE:  2024    INTERPRETATION:  CLINICAL INFORMATION: Pelvic and perineal pain  COMPARISON: CT 2024, ultrasound 2024  CONTRAST/COMPLICATIONS: IV Contrast: IV contrast documented in unlinked concurrent exam (accession [id number]), Gadavist (accession [id number])  5.5 cc administered   2 cc discarded Oral Contrast: NONE Complications: None reported at time of study completion  PROCEDURE: MRI of the abdomen and pelvis was performed. -  FINDINGS: LOWER CHEST: Unremarkable. LIVER: Steatosis. Normal morphology. No focal lesion. GALLBLADDER: Cholecystectomy BILIARY TREE: Normal SPLEEN: Normal. PANCREAS: Normal ADRENALS: Normal KIDNEYS: Normal VASCULAR: Normal  UTERUS/ENDOMETRIUM: Contains a 3.7 x 3.3 x 3.1 cm intracavitary uterine fibroid with diffuse uniform enhancement. No other fibroids are identified. Endometrium is otherwise unremarkable. JUNCTIONAL ZONE: Unremarkable.  RIGHT OVARY: Measures 3.0 x 1.8 cm (6; 20) and contains several small follicles. LEFT OVARY: Measures 3.0 x 2.8 cm and contains a 1.4 cm follicle. ADNEXA: Within normal limits.  BLADDER: Within normal limits.  LYMPH NODES: No lymphadenopathy. BOWEL: periampullary duodenal diverticulum. Otherwise within Normal limits. PERITONEUM: No ascites. VESSELS: Within normal limits. ABDOMINAL WALL: Small fat-containing umbilical hernia. BONES: Within normal limits.  IMPRESSION: Large intracavitary uterine myoma, unchanged from recent ultrasound and CT examinations.  Fatty liver.  Otherwise unremarkable MRI.    --- End of Report ---

## 2024-09-05 NOTE — HISTORY OF PRESENT ILLNESS
[FreeTextEntry1] : Pt is a 44yo  (LMP 24) who presents for f/u to discuss MR results of fibroid. Today, patient has no complaints. During last period, she had similar RLQ pain that improved with Tylenol. She denies fever, chills, nausea, vomiting, chest pain, difficulty breathing, epigastric pain, upper abdominal pain, dysuria, hematuria, urinary urgency or frequency, vaginal pain or discharge.  She presented to the ED on 24 for RLQ abdominal pain. TVUS revealed 3.4 x 2.8 x 3.3 cm endometrial mass suggestive of an intracavitary fibroid, consistent with CT results from same visit. A previous TVUS from 2018 noted the size of the fibroid to be 2.8 x 2.5 x 2.4 cm. She was discharged with a 2-week course of doxycycline and metronidazole, which pt states improved her pain.   OBHx: , all vaginal full-term deliveries, no pregnancy or PP complications GYNHx: -Menstrual: LMP 24, Regular menstrual periods q30 days, lasts 8 days, regular flow, no intermenstrual bleeding -STIs: denies -Contraception: tubal ligation performed 2019 -Sexually active with , no other partners -Pap: normal 2024 per pt -Mammo: , pt states she will be getting a breast biopsy on 24 for 2 lumps in right breast   EXAM: [id number] - MR ABDOMEN WAW IC  - ORDERED BY: TAMMY ORTIZ  EXAM: [id number] - MR PELVIS WAW IC  - ORDERED BY: TAMMY ORTIZ   PROCEDURE DATE:  2024    INTERPRETATION:  CLINICAL INFORMATION: Pelvic and perineal pain  COMPARISON: CT 2024, ultrasound 2024  CONTRAST/COMPLICATIONS: IV Contrast: IV contrast documented in unlinked concurrent exam (accession [id number]), Gadavist (accession [id number])  5.5 cc administered   2 cc discarded Oral Contrast: NONE Complications: None reported at time of study completion  PROCEDURE: MRI of the abdomen and pelvis was performed. -  FINDINGS: LOWER CHEST: Unremarkable. LIVER: Steatosis. Normal morphology. No focal lesion. GALLBLADDER: Cholecystectomy BILIARY TREE: Normal SPLEEN: Normal. PANCREAS: Normal ADRENALS: Normal KIDNEYS: Normal VASCULAR: Normal  UTERUS/ENDOMETRIUM: Contains a 3.7 x 3.3 x 3.1 cm intracavitary uterine fibroid with diffuse uniform enhancement. No other fibroids are identified. Endometrium is otherwise unremarkable. JUNCTIONAL ZONE: Unremarkable.  RIGHT OVARY: Measures 3.0 x 1.8 cm (6; 20) and contains several small follicles. LEFT OVARY: Measures 3.0 x 2.8 cm and contains a 1.4 cm follicle. ADNEXA: Within normal limits.  BLADDER: Within normal limits.  LYMPH NODES: No lymphadenopathy. BOWEL: periampullary duodenal diverticulum. Otherwise within Normal limits. PERITONEUM: No ascites. VESSELS: Within normal limits. ABDOMINAL WALL: Small fat-containing umbilical hernia. BONES: Within normal limits.  IMPRESSION: Large intracavitary uterine myoma, unchanged from recent ultrasound and CT examinations.  Fatty liver.  Otherwise unremarkable MRI.    --- End of Report ---

## 2024-09-05 NOTE — PLAN
[FreeTextEntry1] : Pt is a 42yo  (LMP 24) who presents for f/u to discuss MR results of fibroid. Patient had RLQ pain during last period as well which improved with Tylenol. TVUS showed 3.4 x 2.8 x 3.3 cm fibroid that has increased in size since previous imaging in 2018. MR ab/pelvis last visit showed 3.7 x 3.3 x 3.1 cm intracavitary uterine fibroid with diffuse uniform enhancement otherwise unremarkable.   #Fibroid - MR abdomen/pelvis showed 3.7 x 3.3 x 3.1 cm intracavitary uterine fibroid with diffuse uniform enhancement otherwise unremarkable.  - Patient w/ RLQ pain during periods but otherwise periods are regular, no complaints of heavy bleeding  - Booking clinic appt for possible removal   #Health care maintenance - Pap/HPV collected today - Gardasil #1 24  - Gardasil #2, 2 mo after   Discussed with Dr. Tolbert,  Isauro Cuba, PGY2

## 2024-09-05 NOTE — PLAN
[FreeTextEntry1] : Pt is a 44yo  (LMP 24) who presents for f/u to discuss MR results of fibroid. Patient had RLQ pain during last period as well which improved with Tylenol. TVUS showed 3.4 x 2.8 x 3.3 cm fibroid that has increased in size since previous imaging in 2018. MR ab/pelvis last visit showed 3.7 x 3.3 x 3.1 cm intracavitary uterine fibroid with diffuse uniform enhancement otherwise unremarkable.   #Fibroid - MR abdomen/pelvis showed 3.7 x 3.3 x 3.1 cm intracavitary uterine fibroid with diffuse uniform enhancement otherwise unremarkable.  - Patient w/ RLQ pain during periods but otherwise periods are regular, no complaints of heavy bleeding  - Booking clinic appt for possible removal   #Health care maintenance - Pap/HPV collected today - Gardasil #1 24  - Gardasil #2, 2 mo after   Discussed with Dr. Tolbert,  Isauro Cuba, PGY2

## 2024-09-23 ENCOUNTER — APPOINTMENT (OUTPATIENT)
Dept: OBGYN | Facility: HOSPITAL | Age: 44
End: 2024-09-23
Payer: COMMERCIAL

## 2024-09-23 ENCOUNTER — OUTPATIENT (OUTPATIENT)
Dept: OUTPATIENT SERVICES | Facility: HOSPITAL | Age: 44
LOS: 1 days | End: 2024-09-23

## 2024-09-23 VITALS
BODY MASS INDEX: 28.57 KG/M2 | HEIGHT: 56 IN | WEIGHT: 127 LBS | HEART RATE: 75 BPM | DIASTOLIC BLOOD PRESSURE: 62 MMHG | SYSTOLIC BLOOD PRESSURE: 114 MMHG

## 2024-09-23 PROCEDURE — 99213 OFFICE O/P EST LOW 20 MIN: CPT | Mod: GE

## 2024-09-23 NOTE — HISTORY OF PRESENT ILLNESS
[FreeTextEntry1] : Pt is a 44yo  (LMP: 2024) who presents for gyn care follow up. Patient reports she is due for her second dose of Gardisil today. Patient denies any acute complaints today. Denies fever, chills, n/v, sob, chest pain.   Patient continues to endorse heavy menstrual bleeding, abdominal cramping, and hx of fibroids. She endorses bleeding through >20 pads/day on her heaviest days of her period, with occasional leakage onto her clothing. Endorse RLQ pain during her periods, controlled on oral over the counter analgesics. Patient previously seen for this hx fibroids in clinic with TVUS (2024): 3.4 x 2.8 x 3.3 cm endometrial mass suggestive of an intracavitary fibroid. F/u MRI in 2024 revealed the following:   UTERUS/ENDOMETRIUM: Contains a 3.7 x 3.3 x 3.1 cm intracavitary uterine fibroid with diffuse uniform enhancement. No other fibroids are identified. Endometrium is otherwise unremarkable. JUNCTIONAL ZONE: Unremarkable. RIGHT OVARY: Measures 3.0 x 1.8 cm (6; 20) and contains several small follicles. LEFT OVARY: Measures 3.0 x 2.8 cm and contains a 1.4 cm follicle. ADNEXA: Within normal limits.  Denies dizziness, light-headedness, SOB, CP, palpitations, constipation, or urinary symptoms at this time. Patient to have follow up in booking clinic for possible surgical removal of fibroids in October.   OBHx:  x7, reports gHTN with G7 postpartum  GYNHx: Menstrual hx and hx of fibroids as above. Denies abnormal paps, with last one being this year. Denies any other hx. PMSH: fatty liver, appendectomy/ cholecystectomy/ liver resection 17 years ago in [facility]  Med: OCP All: Denies SH: denies

## 2024-09-23 NOTE — REVIEW OF SYSTEMS
[Abdominal Pain] : abdominal pain [Fever] : no fever [Chills] : no chills [Fatigue] : no fatigue [Poor Appetite] : appetite not poor [Dec Hearing] : no decreased hearing [Nasal Discharge] : no nasal discharge [Sore Throat] : no sore throat [Dyspnea] : no dyspnea [Cough] : no cough [SOB on Exertion] : no shortness of breath on exertion [Chest Pain] : no chest pain [Palpitations] : no palpitations [Constipation] : no constipation [Diarrhea] : diarrhea [Vomiting] : no vomiting [Urgency] : no urgency [Frequency] : no frequency [Incontinence] : no incontinence [Headache] : no headache [Dizziness] : no dizziness

## 2024-09-23 NOTE — PLAN
[FreeTextEntry1] : Pt is a 42yo  (LMP 24) who presents for f/u for gyn care. Patient received her first dose of Gardisil 2 months ago and is due for second dose today. Patient also continues to endorse heavy bleeding with her menstrual periods, likely secondary to her fibroids. No other acute concerns today.   #Fibroid - MR A/P showed 3.7 x 3.3 x 3.1 cm intracavitary uterine fibroid with diffuse uniform enhancement  - Currently on OCPs  - Booking clinic appt in October   #Health care maintenance - Gardasil #1 24 - Due for Gardasil #2 today - Gardasil #3 in 4 months  Discussed with Dr. Caitlyn Degroot PGY1

## 2024-09-23 NOTE — PHYSICAL EXAM
[Appropriately responsive] : appropriately responsive [Soft] : soft [Non-distended] : non-distended [No Lesions] : no lesions [Oriented x3] : oriented x3 [FreeTextEntry3] : Grossly normal  [FreeTextEntry5] : Breathing comfortably on room air  [FreeTextEntry7] : Mildly tender to deep palpation in RLQ, no guarding or rebound. Healed mid-line vertical incision

## 2024-09-23 NOTE — HISTORY OF PRESENT ILLNESS
[FreeTextEntry1] : Pt is a 42yo  (LMP: 2024) who presents for gyn care follow up. Patient reports she is due for her second dose of Gardisil today. Patient denies any acute complaints today. Denies fever, chills, n/v, sob, chest pain.   Patient continues to endorse heavy menstrual bleeding, abdominal cramping, and hx of fibroids. She endorses bleeding through >20 pads/day on her heaviest days of her period, with occasional leakage onto her clothing. Endorse RLQ pain during her periods, controlled on oral over the counter analgesics. Patient previously seen for this hx fibroids in clinic with TVUS (2024): 3.4 x 2.8 x 3.3 cm endometrial mass suggestive of an intracavitary fibroid. F/u MRI in 2024 revealed the following:   UTERUS/ENDOMETRIUM: Contains a 3.7 x 3.3 x 3.1 cm intracavitary uterine fibroid with diffuse uniform enhancement. No other fibroids are identified. Endometrium is otherwise unremarkable. JUNCTIONAL ZONE: Unremarkable. RIGHT OVARY: Measures 3.0 x 1.8 cm (6; 20) and contains several small follicles. LEFT OVARY: Measures 3.0 x 2.8 cm and contains a 1.4 cm follicle. ADNEXA: Within normal limits.  Denies dizziness, light-headedness, SOB, CP, palpitations, constipation, or urinary symptoms at this time. Patient to have follow up in booking clinic for possible surgical removal of fibroids in October.   OBHx:  x7, reports gHTN with G7 postpartum  GYNHx: Menstrual hx and hx of fibroids as above. Denies abnormal paps, with last one being this year. Denies any other hx. PMSH: fatty liver, appendectomy/ cholecystectomy/ liver resection 17 years ago in [facility]  Med: OCP All: Denies SH: denies

## 2024-09-23 NOTE — PLAN
[FreeTextEntry1] : Pt is a 44yo  (LMP 24) who presents for f/u for gyn care. Patient received her first dose of Gardisil 2 months ago and is due for second dose today. Patient also continues to endorse heavy bleeding with her menstrual periods, likely secondary to her fibroids. No other acute concerns today.   #Fibroid - MR A/P showed 3.7 x 3.3 x 3.1 cm intracavitary uterine fibroid with diffuse uniform enhancement  - Currently on OCPs  - Booking clinic appt in October   #Health care maintenance - Gardasil #1 24 - Due for Gardasil #2 today - Gardasil #3 in 4 months  Discussed with Dr. Caitlyn Degroot PGY1

## 2024-09-23 NOTE — HISTORY OF PRESENT ILLNESS
[FreeTextEntry1] : Pt is a 44yo  (LMP: 2024) who presents for gyn care follow up. Patient reports she is due for her second dose of Gardisil today. Patient denies any acute complaints today. Denies fever, chills, n/v, sob, chest pain.   Patient continues to endorse heavy menstrual bleeding, abdominal cramping, and hx of fibroids. She endorses bleeding through >20 pads/day on her heaviest days of her period, with occasional leakage onto her clothing. Endorse RLQ pain during her periods, controlled on oral over the counter analgesics. Patient previously seen for this hx fibroids in clinic with TVUS (2024): 3.4 x 2.8 x 3.3 cm endometrial mass suggestive of an intracavitary fibroid. F/u MRI in 2024 revealed the following:   UTERUS/ENDOMETRIUM: Contains a 3.7 x 3.3 x 3.1 cm intracavitary uterine fibroid with diffuse uniform enhancement. No other fibroids are identified. Endometrium is otherwise unremarkable. JUNCTIONAL ZONE: Unremarkable. RIGHT OVARY: Measures 3.0 x 1.8 cm (6; 20) and contains several small follicles. LEFT OVARY: Measures 3.0 x 2.8 cm and contains a 1.4 cm follicle. ADNEXA: Within normal limits.  Denies dizziness, light-headedness, SOB, CP, palpitations, constipation, or urinary symptoms at this time. Patient to have follow up in booking clinic for possible surgical removal of fibroids in October.   OBHx:  x7, reports gHTN with G7 postpartum  GYNHx: Menstrual hx and hx of fibroids as above. Denies abnormal paps, with last one being this year. Denies any other hx. PMSH: fatty liver, appendectomy/ cholecystectomy/ liver resection 17 years ago in [facility]  Med: OCP All: Denies SH: denies

## 2024-09-24 DIAGNOSIS — Z00.00 ENCOUNTER FOR GENERAL ADULT MEDICAL EXAMINATION WITHOUT ABNORMAL FINDINGS: ICD-10-CM

## 2024-10-02 ENCOUNTER — APPOINTMENT (OUTPATIENT)
Dept: OBGYN | Facility: HOSPITAL | Age: 44
End: 2024-10-02
Payer: COMMERCIAL

## 2024-10-02 ENCOUNTER — RESULT REVIEW (OUTPATIENT)
Age: 44
End: 2024-10-02

## 2024-10-02 VITALS
BODY MASS INDEX: 28.79 KG/M2 | SYSTOLIC BLOOD PRESSURE: 107 MMHG | DIASTOLIC BLOOD PRESSURE: 72 MMHG | WEIGHT: 128 LBS | HEIGHT: 56 IN | HEART RATE: 71 BPM | TEMPERATURE: 97.8 F

## 2024-10-02 LAB
HCG UR QL: NEGATIVE
QUALITY CONTROL: YES

## 2024-10-02 PROCEDURE — 58100 BIOPSY OF UTERUS LINING: CPT | Mod: GC

## 2024-10-02 PROCEDURE — 99213 OFFICE O/P EST LOW 20 MIN: CPT | Mod: TH,25,GC

## 2024-10-07 NOTE — PLAN
[FreeTextEntry1] : 43-year-old  LMP 24 presents to booking clinic for surgical consult due to 3cm intracavitary fibroid discovered after patient presented to clinic with complaints of heavy menstrual bleeding. Given size and location of fibroid patient counseled that surgical option was most appropriate at this. Patient counseled on surgical options including operative hysteroscopy with fibroid resection. Patient counseled while bleeding might cease with this intervention, there was always a chance the bleeding might persist and that the fibroid might regrow. Patient also given option for definitive management, which would be hysterectomy. Patient informed that hysterectomy would be a same day procedure that would most likely be performed laparoscopically with small incisions. Patient expressed understanding of both options, requesting hysterectomy at this time. Given patient's age, patient informed that we would leave ovaries in situ. Patient expressed understanding  -r/b/a of surgery discussed -Instructed to get PCP appointment for clearance 2/2 history of fatty liver -EMBx performed as part of surgical planning -Booking form submitted for TLH, BS  discussed w Dr Key Laurent PGY4  MIGS Fellow Addendum:  44 yo P7 presenting with HMB and dyspareunia due to submucosal fibroid. She has tried nexplanon in the past and is s/p ppBTL. She is currently not using any hormones or medications to help with her symptoms and desires definitive surgical management. She has completed child bearing. PMSH significant for 7 prior , fatty liver disease, ex-lap liver resection (supraumbilical midline vertical), lsc appendectomy, lsc cholecystectomy, ppBTL. Denies h/o abnormal paps. Physical exam revealed supra-umbilical midline vertical abdominal scar, 10w size anteverted and mobile uterus with non-palpable b/l adnexa. 2024 MRI revealed 9cm uterus with 3.7x3.3x3.1cm intracavitary uterine fibroid. Discussed medical, procedural, and surgical options for management. Pt desires TLH, BS, cysto after risks/benefits/alternatives reviewed. EMBx performed without difficulty. Pt to obtain PCP clearance. Booking form submitted.  Avril Mackey MD [facility] [place], PGY-6

## 2024-10-07 NOTE — PLAN
[FreeTextEntry1] : 43-year-old  LMP 24 presents to booking clinic for surgical consult due to 3cm intracavitary fibroid discovered after patient presented to clinic with complaints of heavy menstrual bleeding. Given size and location of fibroid patient counseled that surgical option was most appropriate at this. Patient counseled on surgical options including operative hysteroscopy with fibroid resection. Patient counseled while bleeding might cease with this intervention, there was always a chance the bleeding might persist and that the fibroid might regrow. Patient also given option for definitive management, which would be hysterectomy. Patient informed that hysterectomy would be a same day procedure that would most likely be performed laparoscopically with small incisions. Patient expressed understanding of both options, requesting hysterectomy at this time. Given patient's age, patient informed that we would leave ovaries in situ. Patient expressed understanding  -r/b/a of surgery discussed -Instructed to get PCP appointment for clearance 2/2 history of fatty liver -EMBx performed as part of surgical planning -Booking form submitted for TLH, BS  discussed w Dr Key Laurent PGY4  MIGS Fellow Addendum:  44 yo P7 presenting with HMB and dyspareunia due to submucosal fibroid. She has tried nexplanon in the past and is s/p ppBTL. She is currently not using any hormones or medications to help with her symptoms and desires definitive surgical management. She has completed child bearing. PMSH significant for 7 prior , fatty liver disease, ex-lap liver resection (supraumbilical midline vertical), lsc appendectomy, lsc cholecystectomy, ppBTL. Denies h/o abnormal paps. Physical exam revealed supra-umbilical midline vertical abdominal scar, 10w size anteverted and mobile uterus with non-palpable b/l adnexa. 2024 MRI revealed 9cm uterus with 3.7x3.3x3.1cm intracavitary uterine fibroid. Discussed medical, procedural, and surgical options for management. Pt desires TLH, BS, cysto after risks/benefits/alternatives reviewed. EMBx performed without difficulty. Pt to obtain PCP clearance. Booking form submitted.  Avril Mackey MD [facility] [place], PGY-6

## 2024-10-07 NOTE — HISTORY OF PRESENT ILLNESS
[FreeTextEntry1] : : [id number]  43-year-old  LMP 24 presents to booking clinic for surgical consult due to 3cm intracavitary fibroid discovered after patient presented to clinic with complaints of heavy menstrual bleeding. Patient states that her periods are regular albeit heavy. States that they typically last 8-15 days where she goes through 20-25 pads per day on heaviest days, which are three days of her cycle. States that during these days she experiences lightheadedness and dizziness but denies any syncopal episodes. Of note, patient states that over the past three months her menses have become more painful. Endorses taking Tylenol intermittently through the cycle. Endorses concomitant dyspareunia and dyschezia. Patient endorses one episode of intermenstrual bleeding.  Of note patient had previously had nexplanon for contraception; however, after her last delivery in 2019 she had a BTL. Patient has no used contraception since this time  OB:  x7 GYN: Menstrual hx and fibroids as above PMHx: Fatty Liver Surgeries: Ex-Lap Liver Resection, Appendectomy, Cholecystectomy All: NKDA Social: Denies toxic habits x3

## 2024-10-07 NOTE — PLAN
[FreeTextEntry1] : 43-year-old  LMP 24 presents to booking clinic for surgical consult due to 3cm intracavitary fibroid discovered after patient presented to clinic with complaints of heavy menstrual bleeding. Given size and location of fibroid patient counseled that surgical option was most appropriate at this. Patient counseled on surgical options including operative hysteroscopy with fibroid resection. Patient counseled while bleeding might cease with this intervention, there was always a chance the bleeding might persist and that the fibroid might regrow. Patient also given option for definitive management, which would be hysterectomy. Patient informed that hysterectomy would be a same day procedure that would most likely be performed laparoscopically with small incisions. Patient expressed understanding of both options, requesting hysterectomy at this time. Given patient's age, patient informed that we would leave ovaries in situ. Patient expressed understanding  -r/b/a of surgery discussed -Instructed to get PCP appointment for clearance 2/2 history of fatty liver -EMBx performed as part of surgical planning -Booking form submitted for TLH, BS  discussed w Dr Key Laurent PGY4  MIGS Fellow Addendum:  44 yo P7 presenting with HMB and dyspareunia due to submucosal fibroid. She has tried nexplanon in the past and is s/p ppBTL. She is currently not using any hormones or medications to help with her symptoms and desires definitive surgical management. She has completed child bearing. PMSH significant for 7 prior , fatty liver disease, ex-lap liver resection (supraumbilical midline vertical), lsc appendectomy, lsc cholecystectomy, ppBTL. Denies h/o abnormal paps. Physical exam revealed supra-umbilical midline vertical abdominal scar, 10w size anteverted and mobile uterus with non-palpable b/l adnexa. 2024 MRI revealed 9cm uterus with 3.7x3.3x3.1cm intracavitary uterine fibroid. Discussed medical, procedural, and surgical options for management. Pt desires TLH, BS, cysto after risks/benefits/alternatives reviewed. EMBx performed without difficulty. Pt to obtain PCP clearance. Booking form submitted.  Avril Mackey MD [facility] [place], PGY-6

## 2024-10-07 NOTE — HISTORY OF PRESENT ILLNESS
[FreeTextEntry1] : : [id number]  43-year-old  LMP 24 presents to booking clinic for surgical consult due to 3cm intracavitary fibroid discovered after patient presented to clinic with complaints of heavy menstrual bleeding. Patient states that her periods are regular albeit heavy. States that they typically last 8-15 days where she goes through 20-25 pads per day on heaviest days, which are three days of her cycle. States that during these days she experiences lightheadedness and dizziness but denies any syncopal episodes. Of note, patient states that over the past three months her menses have become more painful. Endorses taking Tylenol intermittently through the cycle. Endorses concomitant dyspareunia and dyschezia. Patient endorses one episode of intermenstrual bleeding.  Of note patient had previously had nexplanon for contraception; however, after her last delivery in 2019 she had a BTL. Patient has no used contraception since this time  OB:  x7 GYN: Menstrual hx and fibroids as above PMHx: Fatty Liver Surgeries: Ex-Lap Liver Resection, Appendectomy, Cholecystectomy All: NKDA Social: Denies toxic habits x3

## 2024-10-07 NOTE — PROCEDURE
[Endometrial Biopsy] : Endometrial biopsy [Irregular Bleeding] : irregular uterine bleeding [Risks] : risks [Benefits] : benefits [Alternatives] : alternatives [Patient] : patient [Infection] : infection [Uterine Perforation] : uterine perforation [Pain] : pain [No Premedication] : No premedication [None] : none [Easy Passage] : Easy passage [Moderate] : moderate [Specimen Collected] : collected [Sent to Pathology] : placed in buffered formalin and sent for pathology [Tolerated Well] : Patient tolerated the procedure well [No Complications] : No complications [LMPDate] : 9/18/2024

## 2024-10-07 NOTE — HISTORY OF PRESENT ILLNESS
[FreeTextEntry1] : : [id number]  43-year-old  LMP 24 presents to booking clinic for surgical consult due to 3cm intracavitary fibroid discovered after patient presented to clinic with complaints of heavy menstrual bleeding. Patient states that her periods are regular albeit heavy. States that they typically last 8-15 days where she goes through 20-25 pads per day on heaviest days, which are three days of her cycle. States that during these days she experiences lightheadedness and dizziness but denies any syncopal episodes. Of note, patient states that over the past three months her menses have become more painful. Endorses taking Tylenol intermittently through the cycle. Endorses concomitant dyspareunia and dyschezia. Patient endorses one episode of intermenstrual bleeding.  Of note patient had previously had nexplanon for contraception; however, after her last delivery in 2019 she had a BTL. Patient has no used contraception since this time  OB:  x7 GYN: Menstrual hx and fibroids as above PMHx: Fatty Liver Surgeries: Ex-Lap Liver Resection, Appendectomy, Cholecystectomy All: NKDA Social: Denies toxic habits x3

## 2024-10-14 ENCOUNTER — EMERGENCY (EMERGENCY)
Facility: HOSPITAL | Age: 44
LOS: 1 days | Discharge: HOME | End: 2024-10-14
Attending: STUDENT IN AN ORGANIZED HEALTH CARE EDUCATION/TRAINING PROGRAM | Admitting: STUDENT IN AN ORGANIZED HEALTH CARE EDUCATION/TRAINING PROGRAM
Payer: MEDICAID

## 2024-10-14 VITALS
WEIGHT: 125 LBS | SYSTOLIC BLOOD PRESSURE: 122 MMHG | DIASTOLIC BLOOD PRESSURE: 74 MMHG | HEIGHT: 56 IN | HEART RATE: 87 BPM | TEMPERATURE: 98 F | RESPIRATION RATE: 18 BRPM | OXYGEN SATURATION: 99 %

## 2024-10-14 DIAGNOSIS — Z90.49 ACQUIRED ABSENCE OF OTHER SPECIFIED PARTS OF DIGESTIVE TRACT: Chronic | ICD-10-CM

## 2024-10-14 DIAGNOSIS — R16.0 HEPATOMEGALY, NOT ELSEWHERE CLASSIFIED: Chronic | ICD-10-CM

## 2024-10-14 LAB
ADD ON TEST-SPECIMEN IN LAB: SIGNIFICANT CHANGE UP
ADD ON TEST-SPECIMEN IN LAB: SIGNIFICANT CHANGE UP
ALBUMIN SERPL ELPH-MCNC: 3.7 G/DL — SIGNIFICANT CHANGE UP (ref 3.3–5)
ALP SERPL-CCNC: 72 U/L — SIGNIFICANT CHANGE UP (ref 40–120)
ALT FLD-CCNC: 12 U/L — SIGNIFICANT CHANGE UP (ref 4–33)
ANION GAP SERPL CALC-SCNC: 12 MMOL/L — SIGNIFICANT CHANGE UP (ref 7–14)
APTT BLD: 25.6 SEC — SIGNIFICANT CHANGE UP (ref 24.5–35.6)
AST SERPL-CCNC: 16 U/L — SIGNIFICANT CHANGE UP (ref 4–32)
BASOPHILS # BLD AUTO: 0.02 K/UL — SIGNIFICANT CHANGE UP (ref 0–0.2)
BASOPHILS NFR BLD AUTO: 0.2 % — SIGNIFICANT CHANGE UP (ref 0–2)
BILIRUB SERPL-MCNC: <0.2 MG/DL — SIGNIFICANT CHANGE UP (ref 0.2–1.2)
BUN SERPL-MCNC: 13 MG/DL — SIGNIFICANT CHANGE UP (ref 7–23)
CALCIUM SERPL-MCNC: 8.1 MG/DL — LOW (ref 8.4–10.5)
CHLORIDE SERPL-SCNC: 107 MMOL/L — SIGNIFICANT CHANGE UP (ref 98–107)
CO2 SERPL-SCNC: 20 MMOL/L — LOW (ref 22–31)
CREAT SERPL-MCNC: 0.7 MG/DL — SIGNIFICANT CHANGE UP (ref 0.5–1.3)
EGFR: 111 ML/MIN/1.73M2 — SIGNIFICANT CHANGE UP
EOSINOPHIL # BLD AUTO: 0.16 K/UL — SIGNIFICANT CHANGE UP (ref 0–0.5)
EOSINOPHIL NFR BLD AUTO: 1.6 % — SIGNIFICANT CHANGE UP (ref 0–6)
GLUCOSE SERPL-MCNC: 124 MG/DL — HIGH (ref 70–99)
HCT VFR BLD CALC: 19 % — CRITICAL LOW (ref 34.5–45)
HGB BLD-MCNC: 6.1 G/DL — CRITICAL LOW (ref 11.5–15.5)
IANC: 6.58 K/UL — SIGNIFICANT CHANGE UP (ref 1.8–7.4)
IMM GRANULOCYTES NFR BLD AUTO: 1.2 % — HIGH (ref 0–0.9)
INR BLD: 0.98 RATIO — SIGNIFICANT CHANGE UP (ref 0.85–1.16)
LYMPHOCYTES # BLD AUTO: 2.61 K/UL — SIGNIFICANT CHANGE UP (ref 1–3.3)
LYMPHOCYTES # BLD AUTO: 26.1 % — SIGNIFICANT CHANGE UP (ref 13–44)
MCHC RBC-ENTMCNC: 24.8 PG — LOW (ref 27–34)
MCHC RBC-ENTMCNC: 32.1 GM/DL — SIGNIFICANT CHANGE UP (ref 32–36)
MCV RBC AUTO: 77.2 FL — LOW (ref 80–100)
MONOCYTES # BLD AUTO: 0.52 K/UL — SIGNIFICANT CHANGE UP (ref 0–0.9)
MONOCYTES NFR BLD AUTO: 5.2 % — SIGNIFICANT CHANGE UP (ref 2–14)
NEUTROPHILS # BLD AUTO: 6.58 K/UL — SIGNIFICANT CHANGE UP (ref 1.8–7.4)
NEUTROPHILS NFR BLD AUTO: 65.7 % — SIGNIFICANT CHANGE UP (ref 43–77)
NRBC # BLD: 0 /100 WBCS — SIGNIFICANT CHANGE UP (ref 0–0)
NRBC # FLD: 0.07 K/UL — HIGH (ref 0–0)
PLATELET # BLD AUTO: 385 K/UL — SIGNIFICANT CHANGE UP (ref 150–400)
POTASSIUM SERPL-MCNC: 3.4 MMOL/L — LOW (ref 3.5–5.3)
POTASSIUM SERPL-SCNC: 3.4 MMOL/L — LOW (ref 3.5–5.3)
PROT SERPL-MCNC: 6.2 G/DL — SIGNIFICANT CHANGE UP (ref 6–8.3)
PROTHROM AB SERPL-ACNC: 11.4 SEC — SIGNIFICANT CHANGE UP (ref 9.9–13.4)
RBC # BLD: 2.46 M/UL — LOW (ref 3.8–5.2)
RBC # FLD: 17.3 % — HIGH (ref 10.3–14.5)
SODIUM SERPL-SCNC: 139 MMOL/L — SIGNIFICANT CHANGE UP (ref 135–145)
WBC # BLD: 10.01 K/UL — SIGNIFICANT CHANGE UP (ref 3.8–10.5)
WBC # FLD AUTO: 10.01 K/UL — SIGNIFICANT CHANGE UP (ref 3.8–10.5)

## 2024-10-14 PROCEDURE — 99223 1ST HOSP IP/OBS HIGH 75: CPT

## 2024-10-14 PROCEDURE — 93010 ELECTROCARDIOGRAM REPORT: CPT

## 2024-10-14 PROCEDURE — 76830 TRANSVAGINAL US NON-OB: CPT | Mod: 26

## 2024-10-14 RX ORDER — ACETAMINOPHEN 325 MG
975 TABLET ORAL ONCE
Refills: 0 | Status: COMPLETED | OUTPATIENT
Start: 2024-10-14 | End: 2024-10-14

## 2024-10-14 NOTE — CONSULT NOTE ADULT - SUBJECTIVE AND OBJECTIVE BOX
*INCOMPLETE NOTE*    MARNIE RIVERO  43y  Female [id number]     ID:     HPI:        Name of GYN Physician: [facility]  OBHx:   x7  GynHx: Denies fibroids, cysts, endometriosis, STI's, Abnormal pap smears   PMH: fatty liver  PSH: ex-lap liver resection, appendectomy, cholecystectomy  Meds:   Allx: NKDA  Social History:  Denies smoking use, drug use, alcohol use.   +occasional social alcohol use    Vital Signs Last 24 Hrs  T(C): 36.4 (14 Oct 2024 21:32), Max: 36.8 (14 Oct 2024 16:55)  T(F): 97.6 (14 Oct 2024 21:32), Max: 98.3 (14 Oct 2024 16:55)  HR: 78 (14 Oct 2024 21:32) (78 - 87)  BP: 111/63 (14 Oct 2024 21:32) (110/54 - 122/74)  BP(mean): --  RR: 18 (14 Oct 2024 21:32) (18 - 18)  SpO2: 100% (14 Oct 2024 21:32) (99% - 100%)    Parameters below as of 14 Oct 2024 21:32  Patient On (Oxygen Delivery Method): room air        Physical Exam:   General: sitting comfortably in bed, NAD   HEENT: neck supple, full ROM  CV: RRR  Lungs: CTA b/l, good air flow b/l   Back: No CVA tenderness  Abd: Soft, non-tender, non-distended.  Bowel sounds present.    :  No bleeding on pad.    External labia wnl.  Bimanual exam with no cervical motion tenderness, uterus wnl, adnexa non palpable b/l.  Cervix closed vs. Cervix dilated  Speculum Exam: No active bleeding from os.  Physiologic discharge.    Ext: non-tender b/l, no edema     LABS:                              6.1    10.01 )-----------( 385      ( 14 Oct 2024 18:55 )             19.0     1014    139  |  107  |  13  ----------------------------<  124[H]  3.4[L]   |  20[L]  |  0.70    Ca    8.1[L]      14 Oct 2024 18:55    TPro  6.2  /  Alb  3.7  /  TBili  <0.2  /  DBili  x   /  AST  16  /  ALT  12  /  AlkPhos  72  10-14    I&O's Detail    PT/INR - ( 14 Oct 2024 19:43 )   PT: 11.4 sec;   INR: 0.98 ratio         PTT - ( 14 Oct 2024 19:43 )  PTT:25.6 sec  Urinalysis Basic - ( 14 Oct 2024 18:55 )    Color: x / Appearance: x / SG: x / pH: x  Gluc: 124 mg/dL / Ketone: x  / Bili: x / Urobili: x   Blood: x / Protein: x / Nitrite: x   Leuk Esterase: x / RBC: x / WBC x   Sq Epi: x / Non Sq Epi: x / Bacteria: x        RADIOLOGY & ADDITIONAL STUDIES:  PROCEDURE DATE:  10/14/2024          INTERPRETATION:  CLINICAL INFORMATION: Vaginal bleeding.    LMP: 2024    COMPARISON: MR pelvis 2024.    TECHNIQUE:  Endovaginal and transabdominal pelvic sonogram.    FINDINGS:  Uterus: 11.0 cm x 6.2 cm x 7.3 cm. Within normal limits.  Endometrium: 41 mm. Heterogeneous structure within the endometrial canal   measuring 3.6 x 2.8 x 3.3 cm compatible with intracavitary uterine myoma   as seen on recent MRI.    Right ovary: 1.5 x 1.1 x 2.2 cm. Within normal limits. Normal arterial   and venous waveforms.  Left ovary: 2.3 x 1.8 x 1.3. Within normal limits. Normal arterial and   venous waveforms.    Fluid: None.    IMPRESSION:  Heterogeneous structure within the endometrial canal measuring 3.6 x 2.8   x 3.3 cm compatible with intracavitary uterine myoma as seen on recent   MRI.        --- End of Report ---          ARIA CASTRO MD; Resident Radiologist  This document has been electronically signed.  KAYCE ADEN MD; Attending Radiologist  This document has been electronically signed. Oct 14 2024  9:26PM MARNIE RIVERO  43y  Female [id number]     ID: [id number]    HPI:  44 y/o  LMP 10/1 presenting with lightheadedness and nausea in the setting of AUB. Patient has known history of intracavitary fibroid for which she was being scheduled for TLH, BS (plan for procedure on  per pt). She states that she has been having bleeding since 10/1 that has been heavy when for the past two days she has been experiencing dizziness and nausea which prompted her ED visit. She has changed her pad 6 times today. She states the bleeding is similar to that of her previous cycles. She is not currently on any medications for the AUB. She is endorsing mild abdominal pain. She denies fevers/chills. She denies CP/SOB. She denies any episode of vomiting.     She was seen in GYN clinic on 10/7 when she had an EMBx that showed proliferative endometrium.     Name of GYN Physician: [facility]  OBHx:   x7  GynHx: +h/o fibroids, Denies cysts, endometriosis, STI's, Abnormal pap smears   PMH: fatty liver  PSH: ex-lap liver resection, appendectomy, cholecystectomy  Meds: Tylenol, Motrin PRN   Allx: NKDA  Social History:  Denies smoking use, drug use, alcohol use.   +occasional social alcohol use    Vital Signs Last 24 Hrs  T(C): 36.4 (14 Oct 2024 21:32), Max: 36.8 (14 Oct 2024 16:55)  T(F): 97.6 (14 Oct 2024 21:32), Max: 98.3 (14 Oct 2024 16:55)  HR: 78 (14 Oct 2024 21:32) (78 - 87)  BP: 111/63 (14 Oct 2024 21:32) (110/54 - 122/74)  BP(mean): --  RR: 18 (14 Oct 2024 21:32) (18 - 18)  SpO2: 100% (14 Oct 2024 21:32) (99% - 100%)    Parameters below as of 14 Oct 2024 21:32  Patient On (Oxygen Delivery Method): room air        Physical Exam:   General: sitting comfortably in bed, NAD, currently receiving 1u pRBC  HEENT: neck supple, full ROM  CV: well perfused  Lungs: nonlabored respirations  Abd: Soft, non-tender, non-distended.  Bowel sounds present.    :  Minimal bleeding on pad.  External labia wnl.  Bimanual exam with no cervical motion tenderness, uterus wnl, adnexa non palpable b/l.  Cervix closed.  Speculum Exam: No active bleeding from os.  1cc of dark red blood in vault    LABS:                              6.1    10.01 )-----------( 385      ( 14 Oct 2024 18:55 )             19.0     1014    139  |  107  |  13  ----------------------------<  124[H]  3.4[L]   |  20[L]  |  0.70    Ca    8.1[L]      14 Oct 2024 18:55    TPro  6.2  /  Alb  3.7  /  TBili  <0.2  /  DBili  x   /  AST  16  /  ALT  12  /  AlkPhos  72  10-14    I&O's Detail    PT/INR - ( 14 Oct 2024 19:43 )   PT: 11.4 sec;   INR: 0.98 ratio         PTT - ( 14 Oct 2024 19:43 )  PTT:25.6 sec  Urinalysis Basic - ( 14 Oct 2024 18:55 )    Color: x / Appearance: x / SG: x / pH: x  Gluc: 124 mg/dL / Ketone: x  / Bili: x / Urobili: x   Blood: x / Protein: x / Nitrite: x   Leuk Esterase: x / RBC: x / WBC x   Sq Epi: x / Non Sq Epi: x / Bacteria: x        RADIOLOGY & ADDITIONAL STUDIES:  PROCEDURE DATE:  10/14/2024          INTERPRETATION:  CLINICAL INFORMATION: Vaginal bleeding.    LMP: 2024    COMPARISON: MR pelvis 2024.    TECHNIQUE:  Endovaginal and transabdominal pelvic sonogram.    FINDINGS:  Uterus: 11.0 cm x 6.2 cm x 7.3 cm. Within normal limits.  Endometrium: 41 mm. Heterogeneous structure within the endometrial canal   measuring 3.6 x 2.8 x 3.3 cm compatible with intracavitary uterine myoma   as seen on recent MRI.    Right ovary: 1.5 x 1.1 x 2.2 cm. Within normal limits. Normal arterial   and venous waveforms.  Left ovary: 2.3 x 1.8 x 1.3. Within normal limits. Normal arterial and   venous waveforms.    Fluid: None.    IMPRESSION:  Heterogeneous structure within the endometrial canal measuring 3.6 x 2.8   x 3.3 cm compatible with intracavitary uterine myoma as seen on recent   MRI.        --- End of Report ---          ARIA CASTRO MD; Resident Radiologist  This document has been electronically signed.  KAYCE ADEN MD; Attending Radiologist  This document has been electronically signed. Oct 14 2024  9:26PM

## 2024-10-14 NOTE — ED CDU PROVIDER INITIAL DAY NOTE - CLINICAL SUMMARY MEDICAL DECISION MAKING FREE TEXT BOX
Spoke to the patient with  [id number]. 44 y/o  LMP 10/1 presenting with lightheadedness and nausea in the setting of abnormal uterine bleeding. Patient has known history of intracavitary fibroid for which she was being scheduled for operative intervention on  per pt. She states that she has been having bleeding since 10/1 that has been heavy when for the past two days she has been experiencing lightheadedness, fatigue, mild headaches which prompted her ED visit. She has changed her pad 6 times today. Plan is CDU placement for blood transfusion, possible iron infusion, OBGYN reassessment.

## 2024-10-14 NOTE — ED PROVIDER NOTE - CLINICAL SUMMARY MEDICAL DECISION MAKING FREE TEXT BOX
43-year-old female past medical history of uterine fibroids scheduled for hysterectomy on 11/1 presents to the ED for evaluation of vaginal bleeding for the past 2 weeks.  Patient states she has been going through 3-4 pads every hour for the past 2 weeks, today it has been through 6 pads.Patient is dizzy, short of breath, lightheaded.  CBC came back at 6.1 hemoglobin. Consented for transfusion. OB consulted will come see. Will get a unit of blood and reassess CBC. 43-year-old female past medical history of uterine fibroids scheduled for hysterectomy on 11/1 presents to the ED for evaluation of vaginal bleeding for the past 2 weeks.  Patient states she has been going through 3-4 pads every hour for the past 2 weeks, today it has been through 6 pads. Patient is dizzy, short of breath, lightheaded.  CBC came back at 6.1 hemoglobin. Consented for transfusion. OB consulted will come see. Will get a unit of blood and reassess CBC.

## 2024-10-14 NOTE — ED ADULT NURSE NOTE - OBJECTIVE STATEMENT
44 y/o F arrives to E.D. intake area c/o vaginal bleeding x2 wks. PHx: fibroids, cholecystectomy. Pt is a&ox4, ambulatory, endorses JONES, RR even/unlabored currently, + chest pain with exertion, + intermittent N/V, denies fevers/cough/body aches. Using israel 6-8 pads/day. L 20g IV placed to AC. Frequent monitoring in place.

## 2024-10-14 NOTE — ED PROVIDER NOTE - PATIENT/CAREGIVER ACCEPTED INTERPRETER SERVICES
Progress Note      Patient Name: Kristin Aldrich   Patient ID: [id number]   Sex: Female   YOB: 1976    Primary Care Provider: Kaylin PARRISH   Referring Provider: Kaylin PARRISH    Visit Date: May 12, 2020    Provider: Nat Vance PA-C   Location: [facility]   Location Address: [address] [place] [address]  [id number]   Location Phone: [phone]          Chief Complaint  · Follow up right knee pain      History Of Present Illness  Kristin Aldrich is a 43 year old /White female who presents today to [facility].      She is here for right knee Euflexxa #3/3.       Past Medical History  Allergies; Anxiety; Arthritis; Asthma; Bipolar disorder; Broken Bones; C. difficile diarrhea; Carpal tunnel syndrome; Chronic bronchitis; Chronic Obstructive Pulmonary Disease; COPD (chronic obstructive pulmonary disease); DDD (degenerative disc disease), cervical; Depression; Forgetfulness; Gall stones; Head injury; Hemorrhoids; Hiatal hernia; Hyperlipemia; Hyperlipidemia; Hypertension; IgA deficiency; Incontinence; Insulin resistance; Irritable bowel syndrome; Limb Swelling; Lymphedema; Migraine headache; MRSA infection; Neurologic disorder; Neuropathy; Polydipsia; Polyuria; Primary osteoarthritis of left knee; Psychiatric Care; PTSD (post-traumatic stress disorder); Reflux; Restless leg syndrome; Ringing in ears; Seasonal allergies; Shortness of Breath; Sinus trouble; Skin Disease; Sleep apnea; Slow transit constipation; Thyroid disorder; Vitamin B deficiency         Past Surgical History  Appendectomy; Carpal Tunnel Release; Cholecystecomy; Colonoscopy; Gallbladder; Gastric Sleeve; Hernia         Medication List  albuterol sulfate oral; amitriptyline 150 mg oral tablet; amlodipine 5 mg oral tablet; aripiprazole 5 mg oral tablet; atorvastatin 10 mg oral tablet; Breo Ellipta 100-25 mcg/dose inhalation blister with device; buspirone 15 mg oral tablet; cetirizine 10 mg  oral tablet; chlorhexidine gluconate 0.12 % mucous membrane mouthwash; docusate sodium 100 mg oral capsule; duloxetine 30 mg oral capsule,delayed release(DR/EC); EpiPen 0.3 mg/0.3 mL injection auto-injector; Flonase Allergy Relief 50 mcg/actuation nasal spray,suspension; furosemide 40 mg oral tablet; Linzess 145 mcg oral capsule; liothyronine 25 mcg oral tablet; nadolol 20 mg oral tablet; Nyamyc 100,000 unit/gram topical powder; oxybutynin chloride 5 mg oral tablet extended release 24hr; potassium chloride 10 mEq oral tablet,ER particles/crystals; Singulair 10 mg oral tablet; sucralfate 100 mg/mL oral suspension; topiramate 200 mg oral tablet; Ultram 50 mg oral tablet; Unithroid 200 mcg oral tablet         Allergy List  Bee Stings; NSAIDS; Peppers         Family Medical History  Stroke; Heart Disease; Diabetes, unspecified type; Diabetes; Alcoholism in family; Blood Diseases; Family history of certain chronic disabling diseases; arthritis; Osteoporosis; Family history of Arthritis         Social History  Alcohol (Never); .; lives with other; lives with spouse; Recreational Drug Use (Never); Tobacco (Never); Unemployed.         Review of Systems  · Constitutional  o Denies  o : fever, chills, weight loss  · Cardiovascular  o Denies  o : chest pain, shortness of breath  · Gastrointestinal  o Denies  o : liver disease, heartburn, nausea, blood in stools  · Genitourinary  o Denies  o : painful urination, blood in urine  · Integument  o Denies  o : rash, itching  · Neurologic  o Denies  o : headache, weakness, loss of consciousness  · Musculoskeletal  o Admits  o : painful, swollen joints  · Psychiatric  o Denies  o : drug/alcohol addiction, anxiety, depression      Vitals  Date Time BP Position Site L\R Cuff Size HR RR TEMP (F) WT  HT  BMI kg/m2 BSA m2 O2 Sat        05/12/2020 02:34 PM      81 - R   301lbs 0oz 5'   58.78 2.4 98 %          Physical Examination  · Constitutional  o Appearance  o : well  developed, well-nourished, no obvious deformities present  · Head and Face  o Head  o :   § Inspection  § : normocephalic  o Face  o :   § Inspection  § : no facial lesions  · Eyes  o Conjunctivae  o : conjunctivae normal  o Sclerae  o : sclerae white  · Ears, Nose, Mouth and Throat  o Ears  o :   § External Ears  § : appearance within normal limits  § Hearing  § : intact  o Nose  o :   § External Nose  § : appearance normal  · Neck  o Inspection/Palpation  o : normal appearance  o Range of Motion  o : full range of motion  · Respiratory  o Respiratory Effort  o : breathing unlabored  o Inspection of Chest  o : normal appearance  o Auscultation of Lungs  o : no audible wheezing or rales  · Cardiovascular  o Heart  o : regular rate  · Gastrointestinal  o Abdominal Examination  o : soft and non-tender  · Skin and Subcutaneous Tissue  o General Inspection  o : intact, no rashes  · Psychiatric  o General  o : Alert and oriented x3  o Judgement and Insight  o : judgment and insight intact  o Mood and Affect  o : mood normal, affect appropriate  · Right Knee  o Inspection  o : Ambulating with a walker. Antalgic gait. Pain with movement. Range of motion 0-115. Tender joint lines. Thigh to calf mismatch bilaterally. Minimal muscle mass. + Instability varus/valgus stress. Positive crepitus. Neurovascularly intact. Sensation grossly intact. Pulses normal. Calf supple; non-tender.   · Left Knee  o Inspection  o : Ambulating with a walker. Antalgic gait. Pain with movement. Range of motion 0-115. Tender joint lines. Thigh to calf mismatch bilaterally. Minimal muscle mass. + Instability varus/valgus stress. Positive crepitus. Neurovascularly intact. Sensation grossly intact. Pulses normal. Calf supple; non-tender.   · Injection Note/Aspiration Note  o Site  o : right knee  o Procedure  o : Procedure: After educating the patient, patient gave consent for procedure. After using Chloraprep, the joint space was injected. The patient  tolerated the procedure well.   o Medication  o : Euflexxa, 20 mg           Assessment  · Primary osteoarthritis of right knee     715.16/M17.11      Plan  · Orders  o Euflexxa per dose () - 715.16/M17.11 - 05/12/2020   Lot [id number] exp 12 16 2020 Martita ALONSO  o Knee Intra-articular Injection without US Guidance [facility] ([id number]) - 715.16/M17.11 - 05/12/2020   Nat ALONSO  · Medications  o Medications have been Reconciled  o Transition of Care or Provider Policy  · Instructions  o Reviewed the patient's Past Medical, Social, and Family history as well as the ROS at today's visit, no changes.  o Call or return if worsening symptoms.  o Follow Up PRN.  o Electronically Identified Patient Education Materials Provided Electronically            Electronically Signed by: Nat Vance PA-C -Author on May 20, 2020 07:35:57 AM   yes

## 2024-10-14 NOTE — ED PROVIDER NOTE - ATTENDING CONTRIBUTION TO CARE
Dr. Sorenson, Attending Physician-  I performed a face to face bedside interview with patient regarding history of present illness, review of symptoms and past medical history. I completed an independent physical exam.  I have discussed patient's plan of care with the resident.    43-year-old Icelandic-Speaking F w/Hx of uterine fibroids scheduled for hysterectomy on 11/1 presents to the ED for evaluation of vaginal bleeding for the past 2 weeks.  Patient states she has been going through 3-4 pads every hour for the past 2 weeks, today it has been through 6 pads. Patient is dizzy, short of breath, lightheaded. No worsening pain, denies fever/chills, HA, SOB, CP, n/v/d/c, urinary syx. ROS otherwise neg.    PE: as documented in PE, notable for mucosal pallor, normal strength/sensation, no abd ttp    DDx: anemia 2/2 worsening fibroid bleed vs other intra-uterine pathology; given pt planning for hysterectomy soon, will asses w/labs, t&s, TVUS, obgyn c/s, +/- transfusion if necessary given pallor, dispo pending w/u and obgyn recs. - Daniela Sorenson MD. EM Attending

## 2024-10-14 NOTE — CONSULT NOTE ADULT - ASSESSMENT
A/P 43y  LMP 10/1 p/w symptomatic anemia and heavy vaginal bleeding in setting of history of AUB-L.  TVUS consistent with known 3cm intracavitary fibroid. VSS, however labwork significant for H/H of 6.1/19.0 for which patient is currently receiving 1u pRBC.     Plan  -Would recommend transfusion of 2u pRBC for surgical optimization given that patient is having continued vaginal bleeding  -f/u post-transfusion CBC   -d/c home with TXA 1300mg TID x 5 days  -patient to see PCP for surgical clearance  -patient to continue surgical planning for TLH, BS. Follow up with clinic as needed.   -no acute GYN intervention at this time  -continue management per ED team     Nafisa Rodgers PGY2  d/w Dr. Lucas

## 2024-10-14 NOTE — ED CDU PROVIDER INITIAL DAY NOTE - ATTENDING APP SHARED VISIT CONTRIBUTION OF CARE
I have evaluated the patient and agree with the documentation and assessment as made by the DAVID. We have discussed plan of care and work up for the patient.   This was a shared visit with the DAVID. I reviewed and verified the documentation and independently performed the documented:   History, Exam and Medical Decision Making.    43 y/oF w/Hx of uterine fibroid planning for fibroidectomy presenting with lightheadedness and nausea in the setting of abnormal uterine bleeding. Patient has known history of intracavitary fibroid for which she was being scheduled for operative intervention on 11/7 per pt. She states that she has been having bleeding since 10/1 that has been heavy when for the past two days she has been experiencing lightheadedness, fatigue, mild headaches which prompted her ED visit. She has changed her pad 6 times today. Hgb 6.1 here, OBGYN recommending 2u PRBC transfusion, pt consented in ER, agreeable to CDU. Plan is CDU placement for blood transfusion, possible iron infusion, OBGYN reassessment. - Daniela Sorenson MD. EM Attending

## 2024-10-14 NOTE — ED CDU PROVIDER INITIAL DAY NOTE - OBJECTIVE STATEMENT
44 y/o  LMP 10/1 presenting with lightheadedness and nausea in the setting of AUB. Patient has known history of intracavitary fibroid for which she was being scheduled for operative intervention on  per pt. She states that she has been having bleeding since 10/1 that has been heavy when for the past two days she has been experiencing lightheadedness which prompted her ED visit. She has changed her pad 6 times today. She states the bleeding is similar to that of her previous cycles. She is not currently on any medications for the AUB. She is endorsing mild abdominal pain. She denies fevers/chills. She denies CP/SOB. She denies any episode of vomiting. Spoke to the patient with  [id number]. 42 y/o  LMP 10/1 presenting with lightheadedness and nausea in the setting of abnormal uterine bleeding. Patient has known history of intracavitary fibroid for which she was being scheduled for operative intervention on  per pt. She states that she has been having bleeding since 10/1 that has been heavy when for the past two days she has been experiencing lightheadedness, fatigue, mild headaches which prompted her ED visit. She has changed her pad 6 times today. She states the bleeding is similar to that of her previous cycles. She is not currently on any medications. She denies fevers/chills. She denies CP/SOB. She denies any episode of vomiting, denies any other sx or acute complaints.

## 2024-10-14 NOTE — ED ADULT TRIAGE NOTE - CHIEF COMPLAINT QUOTE
Pt with uterine fibroids states she is scheduled for surgery on 11/7. pt states has been having vaginal bleeding since 10/1. Pt states she feels light headed and very  fatigued  like she may pass out,  pt co sob but denies chest pain. Pt co cramps.

## 2024-10-14 NOTE — ED CDU PROVIDER INITIAL DAY NOTE - NSICDXPASTSURGICALHX_GEN_ALL_CORE_FT
PAST SURGICAL HISTORY:  History of cholecystectomy      PAST SURGICAL HISTORY:  History of cholecystectomy     Liver mass     S/P appendectomy

## 2024-10-14 NOTE — ED PROVIDER NOTE - PROGRESS NOTE DETAILS
Roxanna Pacheco DO PGY-1:  Lab hemoglobin came back 6.1 hematocrit of 19%. Will get patient ready for transfusion Pt consented for blood transfusion, consent signed and in chart. OBGYN consulted for vag bleed i/s/o known fibroids planning for outpatient TLH/BS last seen by Dr. Madrigal last week 10/7 on chart view [facility]. Dispo likely TBDC home after blood transfusion - Daniela Sorenson MD. EM Attending Roxanna Pacheco DO PGY-1:   Patient endorsed to CDU as per OBGYN who reports she needs an extra unit of blood after this one is finished running then discharged with TXA for 5 days, 1300 3 times a day.

## 2024-10-15 VITALS
SYSTOLIC BLOOD PRESSURE: 100 MMHG | DIASTOLIC BLOOD PRESSURE: 63 MMHG | HEART RATE: 77 BPM | RESPIRATION RATE: 16 BRPM | OXYGEN SATURATION: 98 % | TEMPERATURE: 98 F

## 2024-10-15 LAB
BASOPHILS # BLD AUTO: 0.02 K/UL — SIGNIFICANT CHANGE UP (ref 0–0.2)
BASOPHILS NFR BLD AUTO: 0.2 % — SIGNIFICANT CHANGE UP (ref 0–2)
EOSINOPHIL # BLD AUTO: 0.16 K/UL — SIGNIFICANT CHANGE UP (ref 0–0.5)
EOSINOPHIL NFR BLD AUTO: 1.8 % — SIGNIFICANT CHANGE UP (ref 0–6)
HCT VFR BLD CALC: 24.3 % — LOW (ref 34.5–45)
HGB BLD-MCNC: 7.9 G/DL — LOW (ref 11.5–15.5)
IANC: 5.95 K/UL — SIGNIFICANT CHANGE UP (ref 1.8–7.4)
IMM GRANULOCYTES NFR BLD AUTO: 1 % — HIGH (ref 0–0.9)
LYMPHOCYTES # BLD AUTO: 2.37 K/UL — SIGNIFICANT CHANGE UP (ref 1–3.3)
LYMPHOCYTES # BLD AUTO: 26 % — SIGNIFICANT CHANGE UP (ref 13–44)
MCHC RBC-ENTMCNC: 26 PG — LOW (ref 27–34)
MCHC RBC-ENTMCNC: 32.5 GM/DL — SIGNIFICANT CHANGE UP (ref 32–36)
MCV RBC AUTO: 79.9 FL — LOW (ref 80–100)
MONOCYTES # BLD AUTO: 0.54 K/UL — SIGNIFICANT CHANGE UP (ref 0–0.9)
MONOCYTES NFR BLD AUTO: 5.9 % — SIGNIFICANT CHANGE UP (ref 2–14)
NEUTROPHILS # BLD AUTO: 5.95 K/UL — SIGNIFICANT CHANGE UP (ref 1.8–7.4)
NEUTROPHILS NFR BLD AUTO: 65.1 % — SIGNIFICANT CHANGE UP (ref 43–77)
NRBC # BLD: 0 /100 WBCS — SIGNIFICANT CHANGE UP (ref 0–0)
NRBC # FLD: 0.05 K/UL — HIGH (ref 0–0)
PLATELET # BLD AUTO: 329 K/UL — SIGNIFICANT CHANGE UP (ref 150–400)
RBC # BLD: 3.04 M/UL — LOW (ref 3.8–5.2)
RBC # FLD: 16.7 % — HIGH (ref 10.3–14.5)
WBC # BLD: 9.13 K/UL — SIGNIFICANT CHANGE UP (ref 3.8–10.5)
WBC # FLD AUTO: 9.13 K/UL — SIGNIFICANT CHANGE UP (ref 3.8–10.5)

## 2024-10-15 PROCEDURE — 99239 HOSP IP/OBS DSCHRG MGMT >30: CPT

## 2024-10-15 RX ORDER — NORETHINDRONE 0.35 MG/1
1 TABLET ORAL
Qty: 60 | Refills: 0
Start: 2024-10-15 | End: 2024-11-13

## 2024-10-15 RX ORDER — IRON SUCROSE 20 MG/ML
300 INJECTION, SOLUTION INTRAVENOUS ONCE
Refills: 0 | Status: COMPLETED | OUTPATIENT
Start: 2024-10-15 | End: 2024-10-15

## 2024-10-15 RX ORDER — TRANEXAMIC ACID 650 MG/1
2 TABLET ORAL
Qty: 30 | Refills: 0 | DISCHARGE
Start: 2024-10-15 | End: 2024-10-19

## 2024-10-15 RX ADMIN — IRON SUCROSE 176.67 MILLIGRAM(S): 20 INJECTION, SOLUTION INTRAVENOUS at 06:06

## 2024-10-15 RX ADMIN — Medication 40 MILLIEQUIVALENT(S): at 06:07

## 2024-10-15 RX ADMIN — Medication 975 MILLIGRAM(S): at 00:54

## 2024-10-15 NOTE — ED CDU PROVIDER DISPOSITION NOTE - NSFOLLOWUPINSTRUCTIONS_ED_ALL_ED_FT
** La atendieron en la katina de emergencias debido a un sangrado vaginal que probablemente estaba causando carlos eduardo náuseas y aturdimiento.  Le administraron 2 unidades de glóbulos rojos concentrados y yevgeniy intravenoso que resolvieron carlos eduardo síntomas; potts hematocrito aumentó adecuadamente de 6,1 a 7,9.  Dotty usted dijo, el sangrado se ha ralentizado o se ha detenido y usted se siente mejor. Le darán el gómez con 2 medicamentos diferentes:  1. Aygestin (noretindrona) 5 mg bin 1 tableta dos veces al día para continuar tomándolo hasta el día de la cirugía o hasta que el ginecólogo aconseje suspenderlo.  2. TXA (ácido tranexámico) 650 mg, tome 2 tabletas (un total de 1300 mg) 3 veces al día kyle 5 días    La clínica ginecológica lo llamará e intentará acelerar katelyn frantz para que lo atiendan en el consultorio dentro de 1 a 2 semanas.  Actualmente, potts cirugía está programada para el 7/11/2024, el ginecólogo intentará amaris si se puede adelantar.  Regrese si tiene algún síntoma nuevo, que empeora o que le preocupa, que incluye, entre otros: empapar 1 toalla sanitaria erica por hora kyle más de 2 horas, paso de coágulo más erica que el tamaño de un puño, experimenta aturdimiento, mareos, vómitos, incapacidad para tolerar alimentos o bebidas por vía oral.    You were seen in the emergency room due to vaginal bleeding that was likely causing your nausea and lightheadedness.  You were given 2 units of packed red blood cells and IV iron which resolved her symptoms, your hematocrit came up appropriately from 6.1-7.9.  As you stated the bleeding has slowed down/stop and you are feeling better you are being discharged home with 2 different medications:  1.  Aygestin (norethindrone) 5 mg take 1 tablet twice a day to continue taking until the day of surgery or until GYN advises to discontinue  2.  TXA (tranexamic acid) 650 mg take 2 tablets ( total of 1300 mg) 3 times a day for 5 days    The GYN clinic will call and try to expedite an appointment for you to be seen in the office within 1 to 2 weeks.  Currently you are scheduled for your surgery on 11/7/2024, GYN will try to see if this can be moved up to an earlier date.  Please return if you are having any new, worsening, or concerning symptoms that develop which include but are not limited to: soaking through 1 large pad per hour for more than 2 hours, clot passage larger than fist-sized, experiences lightheadedness, dizziness, vomiting, inability to tolerate food or drink by mouth. ***

## 2024-10-15 NOTE — ED CDU PROVIDER DISPOSITION NOTE - CLINICAL COURSE
44 y/o  LMP 10/1 presenting with lightheadedness and nausea in the setting of abnormal uterine bleeding. Patient has known history of intracavitary fibroid for which she was being scheduled for operative intervention on  per pt. She states that she has been having bleeding since 10/1 that has been heavy when for the past two days she has been experiencing lightheadedness, fatigue, mild headaches which prompted her ED visit. She has changed her pad 6 times today. She states the bleeding is similar to that of her previous cycles. She is not currently on any medications. She denies fevers/chills. She denies CP/SOB. She denies any episode of vomiting, denies any other sx or acute complaints. Pt states she feels better no longer with lightheadedness or nausea. Pt also states bleeding as stopped this AM. Pt tolerated 2 units pRBC and has IV iron infusing at this time. Spoke to GYN okay with dc with rx of Aygestin and TXA, sent to [facility]. hgb from 6.1 to 7.9 this AM post pRBC transfusion. GYN Discussed strict return precautions and prompt f/u. pt verbalized an understanding and agrees with the plan.     Encounter done using language line ID # [id number]

## 2024-10-15 NOTE — ED CDU PROVIDER SUBSEQUENT DAY NOTE - ATTENDING APP SHARED VISIT CONTRIBUTION OF CARE
pt with menorrhagia secondary to fibroids pw anemia and bleeding, initial hb 6.1, placed in cdu for prbc and gyn consult, rpt hb 7.9, bleeding improved, per gyn will dc on aygestin and lysteda

## 2024-10-15 NOTE — ED CDU PROVIDER SUBSEQUENT DAY NOTE - HISTORY
Spoke to the patient with  [id number]. 44 y/o  LMP 10/1 presenting with lightheadedness and nausea in the setting of abnormal uterine bleeding. Patient has known history of intracavitary fibroid for which she was being scheduled for operative intervention on  per pt. She states that she has been having bleeding since 10/1 that has been heavy when for the past two days she has been experiencing lightheadedness, fatigue, mild headaches which prompted her ED visit. She has changed her pad 6 times today. She states the bleeding is similar to that of her previous cycles. She is not currently on any medications. She denies fevers/chills. She denies CP/SOB. She denies any episode of vomiting, denies any other sx or acute complaints. The patient has been stable while in CDU, tolerating transfusion, also ordered for IV iron.

## 2024-10-15 NOTE — ED CDU PROVIDER DISPOSITION NOTE - PATIENT PORTAL LINK FT
You can access the FollowMyHealth Patient Portal offered by [facility] by registering at the following website: [email or web address]. By joining [employer]’s FollowMyHealth portal, you will also be able to view your health information using other applications (apps) compatible with our system.

## 2024-10-15 NOTE — PROGRESS NOTE ADULT - SUBJECTIVE AND OBJECTIVE BOX
Conversation preformed in Jordanian per patient request by team members fluent in Jordanian.   Patient seen and examined at bedside. Currently in the ER CDU s/p 2u pRBCs. Currently receiving IV Fe. Pt reports that she feels much better after receiving the blood with the intital symptoms of fatigue, SOB, and nausea are much improved. Vaginal bleeding persistent by decreased overnight. Denies passing any large clot. Pt is ambulating and tolerating PO intake.     Vital Signs Last 24 Hours  T(C): 36.8 (10-15-24 @ 06:06), Max: 36.8 (10-14-24 @ 16:55)  HR: 83 (10-15-24 @ 06:06) (77 - 90)  BP: 96/60 (10-15-24 @ 06:06) (96/60 - 122/74)  RR: 17 (10-15-24 @ 06:06) (16 - 18)  SpO2: 99% (10-15-24 @ 06:06) (99% - 100%)    I&O's Summary    Physical Exam:  General: NAD  CV: RR, S1, S2, no M/R/G  Lungs: CTA b/l, good air flow b/l   Abdomen: Soft, area of point tenderness at left aspect of uterus. Remainder of abdomen is non tender. No rebound or guarding   : Scant brown blood on pad, changed 3 hours prior.   Ext: No pain or swelling     Labs:                        6.1    10.01 )-----------( 385      ( 14 Oct 2024 18:55 )             19.0   baso 0.2    eos 1.6    imm gran 1.2    lymph 26.1   mono 5.2    poly 65.7       MEDICATIONS  (STANDING):    MEDICATIONS  (PRN):

## 2024-10-15 NOTE — PROGRESS NOTE ADULT - ASSESSMENT
A/P 43y  LMP 10/1 p/w symptomatic anemia and heavy vaginal bleeding in setting of history of AUB-L.  TVUS consistent with known 3cm intracavitary fibroid. VSS, however labwork significant for H/H of 6.1/19.0, s/p 2u pRBC. Currently receiving IV Fe. Symptomatic anemia much improved.        S/P 2u PRBC. Follow up post transfusion CBC    D/c home with TXA 1300mg TID x 5 days and Agyestin 5mg BID to be continued until surgery   Pt with PST appointment and PCP follow up prior to surgery.  Pt for planned TLH BS on .   Recommend repeat CBC at PCP appointment or with return GYN appointment for surgical optimization. Discussed with patient who is in aggreement of the plan.     Jael Colon, PGY-4

## 2024-10-15 NOTE — ED CDU PROVIDER SUBSEQUENT DAY NOTE - PROGRESS NOTE DETAILS
Pt states she feels better no longer with lightheadedness or nausea. Pt also states bleeding as stopped this AM. Pt tolerated 2 units pRBC and has IV iron infusing at this time. Spoke to GYN okay with dc with rx of Aygestin and TXA, sent to [facility]. hgb from 6.1 to 7.9 this AM post pRBC transfusion

## 2024-10-15 NOTE — ED CDU PROVIDER DISPOSITION NOTE - ATTENDING CONTRIBUTION TO CARE
pt with anemia 2/2 vaginal bleeding, scheduled for surgery nov 7, received prbc and iron with improvement, seen by gyn and started on txa and aygestin

## 2024-10-30 ENCOUNTER — APPOINTMENT (OUTPATIENT)
Dept: OBGYN | Facility: HOSPITAL | Age: 44
End: 2024-10-30

## 2024-10-30 ENCOUNTER — OUTPATIENT (OUTPATIENT)
Dept: OUTPATIENT SERVICES | Facility: HOSPITAL | Age: 44
LOS: 1 days | End: 2024-10-30

## 2024-10-30 VITALS
SYSTOLIC BLOOD PRESSURE: 112 MMHG | HEART RATE: 76 BPM | HEIGHT: 56.5 IN | WEIGHT: 123.9 LBS | OXYGEN SATURATION: 97 % | DIASTOLIC BLOOD PRESSURE: 72 MMHG | TEMPERATURE: 98 F | RESPIRATION RATE: 16 BRPM

## 2024-10-30 DIAGNOSIS — D25.9 LEIOMYOMA OF UTERUS, UNSPECIFIED: ICD-10-CM

## 2024-10-30 DIAGNOSIS — Z98.890 OTHER SPECIFIED POSTPROCEDURAL STATES: Chronic | ICD-10-CM

## 2024-10-30 DIAGNOSIS — R16.0 HEPATOMEGALY, NOT ELSEWHERE CLASSIFIED: Chronic | ICD-10-CM

## 2024-10-30 DIAGNOSIS — Z98.51 TUBAL LIGATION STATUS: Chronic | ICD-10-CM

## 2024-10-30 DIAGNOSIS — Z90.49 ACQUIRED ABSENCE OF OTHER SPECIFIED PARTS OF DIGESTIVE TRACT: Chronic | ICD-10-CM

## 2024-10-30 LAB
BLD GP AB SCN SERPL QL: NEGATIVE — SIGNIFICANT CHANGE UP
HCT VFR BLD CALC: 33.1 % — LOW (ref 34.5–45)
HGB BLD-MCNC: 10.6 G/DL — LOW (ref 11.5–15.5)
MCHC RBC-ENTMCNC: 27.5 PG — SIGNIFICANT CHANGE UP (ref 27–34)
MCHC RBC-ENTMCNC: 32 G/DL — SIGNIFICANT CHANGE UP (ref 32–36)
MCV RBC AUTO: 85.8 FL — SIGNIFICANT CHANGE UP (ref 80–100)
PLATELET # BLD AUTO: 496 K/UL — HIGH (ref 150–400)
RBC # BLD: 3.86 M/UL — SIGNIFICANT CHANGE UP (ref 3.8–5.2)
RBC # FLD: 20.3 % — HIGH (ref 10.3–14.5)
RH IG SCN BLD-IMP: POSITIVE — SIGNIFICANT CHANGE UP
WBC # BLD: 7.49 K/UL — SIGNIFICANT CHANGE UP (ref 3.8–10.5)
WBC # FLD AUTO: 7.49 K/UL — SIGNIFICANT CHANGE UP (ref 3.8–10.5)

## 2024-10-30 NOTE — H&P PST ADULT - GASTROINTESTINAL COMMENTS
hx benign liver mass, s/p resection as well as appendectomy and cholecystectomy >17 years ago, no complications since

## 2024-10-30 NOTE — H&P PST ADULT - PROBLEM SELECTOR PLAN 1
Pre-op instructions provided. Pt verbalized understanding.   Pepcid provided for GI prophylaxis.   Pt given detailed verbal and written instructions on chlorhexidine wash. Pt verbalized understanding with teachback.   CBC with reflex, T&S done at [facility].   CMP, HgA1c from 10/15/24 in [facility].  Pt instructed to return to [facility] within 72 hours of surgery for repeat T&S (due to recent transfusion).

## 2024-10-30 NOTE — H&P PST ADULT - HISTORY OF PRESENT ILLNESS
44 year old female with c/o prolonged and heavy vaginal bleeding, required blood transfusion 2 units 2 weeks ago. Utrasound revealed uterine fibroid. Pt presents today for presurgical evaluation for  44 year old female recently admitted at [facility] 10/14/24 for prolonged and heavy vaginal bleeding, required blood transfusion 2 units. Utrasound revealed uterine fibroid. Pt presents today for presurgical evaluation for Vaginal Hysterectomy Vaginal Natural Oriface Transluminal Endoscopic Surgery.

## 2024-10-30 NOTE — H&P PST ADULT - NSICDXPASTSURGICALHX_GEN_ALL_CORE_FT
PAST SURGICAL HISTORY:  H/O benign breast biopsy     H/O tubal ligation     History of cholecystectomy     History of surgery of liver     S/P appendectomy

## 2024-10-31 LAB
BASOPHILS # BLD AUTO: 0.03 K/UL — SIGNIFICANT CHANGE UP (ref 0–0.2)
BASOPHILS NFR BLD AUTO: 0.4 % — SIGNIFICANT CHANGE UP (ref 0–2)
EOSINOPHIL # BLD AUTO: 0.11 K/UL — SIGNIFICANT CHANGE UP (ref 0–0.5)
EOSINOPHIL NFR BLD AUTO: 1.5 % — SIGNIFICANT CHANGE UP (ref 0–6)
IMM GRANULOCYTES NFR BLD AUTO: 0.1 % — SIGNIFICANT CHANGE UP (ref 0–0.9)
LYMPHOCYTES # BLD AUTO: 1.74 K/UL — SIGNIFICANT CHANGE UP (ref 1–3.3)
LYMPHOCYTES # BLD AUTO: 23.2 % — SIGNIFICANT CHANGE UP (ref 13–44)
MONOCYTES # BLD AUTO: 0.37 K/UL — SIGNIFICANT CHANGE UP (ref 0–0.9)
MONOCYTES NFR BLD AUTO: 4.9 % — SIGNIFICANT CHANGE UP (ref 2–14)
NEUTROPHILS # BLD AUTO: 5.23 K/UL — SIGNIFICANT CHANGE UP (ref 1.8–7.4)
NEUTROPHILS NFR BLD AUTO: 69.9 % — SIGNIFICANT CHANGE UP (ref 43–77)

## 2024-10-31 RX ORDER — IRON DEXTRAN 50 MG/ML
25 INJECTION, SOLUTION INTRAVENOUS
Qty: 1 | Refills: 0
Start: 2024-10-31

## 2024-10-31 RX ORDER — FERRIC DERISOMALTOSE 1000 MG/10ML
1000 SOLUTION INTRAVENOUS
Qty: 1 | Refills: 0
Start: 2024-10-31

## 2024-10-31 RX ORDER — IRON DEXTRAN 50 MG/ML
975 INJECTION, SOLUTION INTRAVENOUS
Qty: 20 | Refills: 0
Start: 2024-10-31

## 2024-10-31 NOTE — PROGRESS NOTE ADULT - SUBJECTIVE AND OBJECTIVE BOX
Provider Specialty: Anesthesiology    Reason for referral/consultation:  Anemia    Subjective/Objective:    This is a  44y y.o patient, who is scheduled for a Vaginal Hysterectomy with Dr. Hopkins on 11/7.      During the preoperative evaluation, the patient was diagnosed with iron deficiency anemia.  The patient was contacted by phone.  The diagnosis and treatment were explained in detail, including risks and benefits.  Questions were encouraged and answered.  The patient is agreeable with the plan.    Health Issues:  Uterine leiomyoma    Heavy menses    Liver mass    Leiomyoma of uterus        Medications:      Allergies:  No Known Allergies      Labs:  Hb 10.6 g/dL  Hct 33.1 %  Ferritin 114 ng/mL  Ferritin, Serum --  BUN --  CRT --      Assessment:      Iron Deficiency Anemia.  Preoperative anemia has been shown to be an independent risk factor for perioperative morbidity and mortality. This risk is further exacerbated by surgical blood loss and is not mitigated by allogeneic transfusion and the presence of anemia should be evaluated in all surgical patients, especially in those where moderate blood loss is anticipated.    Recommendation:      IV iron 1000 mg x 1 dose.  IV iron is safe and efficacious and should be used as front-line therapy in patients in whom surgery is planned for less than 6 weeks after the diagnosis of anemia, who do not respond to oral iron or are not able to tolerate it.  Suggest repeat lab work 2-4 weeks after iron infusion.

## 2024-11-07 ENCOUNTER — OUTPATIENT (OUTPATIENT)
Dept: OUTPATIENT SERVICES | Facility: HOSPITAL | Age: 44
LOS: 1 days | Discharge: HOME | End: 2024-11-07
Payer: COMMERCIAL

## 2024-11-07 ENCOUNTER — RESULT REVIEW (OUTPATIENT)
Age: 44
End: 2024-11-07

## 2024-11-07 ENCOUNTER — APPOINTMENT (OUTPATIENT)
Dept: OBGYN | Facility: HOSPITAL | Age: 44
End: 2024-11-07

## 2024-11-07 ENCOUNTER — TRANSCRIPTION ENCOUNTER (OUTPATIENT)
Age: 44
End: 2024-11-07

## 2024-11-07 VITALS
DIASTOLIC BLOOD PRESSURE: 68 MMHG | RESPIRATION RATE: 16 BRPM | HEIGHT: 56.5 IN | SYSTOLIC BLOOD PRESSURE: 128 MMHG | TEMPERATURE: 98 F | OXYGEN SATURATION: 100 % | WEIGHT: 123.9 LBS | HEART RATE: 72 BPM

## 2024-11-07 VITALS
DIASTOLIC BLOOD PRESSURE: 65 MMHG | TEMPERATURE: 98 F | OXYGEN SATURATION: 99 % | SYSTOLIC BLOOD PRESSURE: 110 MMHG | HEART RATE: 76 BPM | RESPIRATION RATE: 12 BRPM

## 2024-11-07 DIAGNOSIS — Z98.51 TUBAL LIGATION STATUS: Chronic | ICD-10-CM

## 2024-11-07 DIAGNOSIS — Z98.890 OTHER SPECIFIED POSTPROCEDURAL STATES: Chronic | ICD-10-CM

## 2024-11-07 DIAGNOSIS — D25.9 LEIOMYOMA OF UTERUS, UNSPECIFIED: ICD-10-CM

## 2024-11-07 DIAGNOSIS — R16.0 HEPATOMEGALY, NOT ELSEWHERE CLASSIFIED: Chronic | ICD-10-CM

## 2024-11-07 DIAGNOSIS — Z90.49 ACQUIRED ABSENCE OF OTHER SPECIFIED PARTS OF DIGESTIVE TRACT: Chronic | ICD-10-CM

## 2024-11-07 LAB
GLUCOSE BLDC GLUCOMTR-MCNC: 100 MG/DL — HIGH (ref 70–99)
HCG UR QL: NEGATIVE — SIGNIFICANT CHANGE UP

## 2024-11-07 PROCEDURE — 88307 TISSUE EXAM BY PATHOLOGIST: CPT | Mod: 26

## 2024-11-07 PROCEDURE — 58554 LAPARO-VAG HYST W/T/O COMPL: CPT

## 2024-11-07 RX ORDER — OXYCODONE HYDROCHLORIDE 30 MG/1
1 TABLET ORAL
Qty: 10 | Refills: 0
Start: 2024-11-07

## 2024-11-07 RX ORDER — ONDANSETRON HYDROCHLORIDE 2 MG/ML
4 INJECTION, SOLUTION INTRAMUSCULAR; INTRAVENOUS ONCE
Refills: 0 | Status: ACTIVE | OUTPATIENT
Start: 2024-11-07 | End: 2025-10-06

## 2024-11-07 RX ORDER — SODIUM CHLORIDE 9 MG/ML
3 INJECTION, SOLUTION INTRAMUSCULAR; INTRAVENOUS; SUBCUTANEOUS EVERY 8 HOURS
Refills: 0 | Status: ACTIVE | OUTPATIENT
Start: 2024-11-07 | End: 2025-10-06

## 2024-11-07 RX ORDER — CHLORHEXIDINE GLUCONATE 40 MG/ML
1 SOLUTION TOPICAL ONCE
Refills: 0 | Status: ACTIVE | OUTPATIENT
Start: 2024-11-07

## 2024-11-07 RX ORDER — OXYCODONE HYDROCHLORIDE 30 MG/1
5 TABLET ORAL ONCE
Refills: 0 | Status: DISCONTINUED | OUTPATIENT
Start: 2024-11-07 | End: 2024-11-07

## 2024-11-07 RX ORDER — FENTANYL CITRAT/DEXTROSE 5%/PF 1250MCG/50
50 PATIENT CONTROLLED ANALGESIA SYRINGE INTRAVENOUS
Refills: 0 | Status: DISCONTINUED | OUTPATIENT
Start: 2024-11-07 | End: 2024-11-07

## 2024-11-07 RX ADMIN — OXYCODONE HYDROCHLORIDE 5 MILLIGRAM(S): 30 TABLET ORAL at 15:45

## 2024-11-07 RX ADMIN — Medication 50 MICROGRAM(S): at 15:00

## 2024-11-07 RX ADMIN — Medication 50 MICROGRAM(S): at 14:19

## 2024-11-07 NOTE — ASU DISCHARGE PLAN (ADULT/PEDIATRIC) - CARE PROVIDER_API CALL
[facility],   [phone] [place] [address] the oncology [place] [address]  Phone: [phone]  Fax: (   )    -  Follow Up Time: 2 weeks

## 2024-11-07 NOTE — CHART NOTE - NSCHARTNOTEFT_GEN_A_CORE
Patient seen and examined at bedside, recently post-op. No acute complaints at this time. Denies CP, SOB, N/V, fevers, and chills.    Vital Signs Last 24 Hours  T(C): 36.7 (11-07-24 @ 14:00), Max: 37.1 (11-07-24 @ 13:35)  HR: 70 (11-07-24 @ 15:00) (66 - 80)  BP: 124/62 (11-07-24 @ 15:00) (110/69 - 128/68)  RR: 12 (11-07-24 @ 15:00) (12 - 16)  SpO2: 99% (11-07-24 @ 15:00) (98% - 100%)    I&O's Summary      Physical Exam:  General: NAD  Lungs: breathing comfortably on RA  Abdomen: Soft, appropriately tender, non-distended  : Scant VB  Ext: No pain or swelling    Labs:      MEDICATIONS  (STANDING):  chlorhexidine 2% Cloths 1 Application(s) Topical once  lactated ringers. 1000 milliLiter(s) (30 mL/Hr) IV Continuous <Continuous>  sodium chloride 0.9% lock flush 3 milliLiter(s) IV Push every 8 hours    MEDICATIONS  (PRN):  fentaNYL    Injectable 50 MICROGram(s) IV Push every 10 minutes PRN Severe Pain (7 - 10)  ondansetron Injectable 4 milliGRAM(s) IV Push once PRN Nausea and/or Vomiting  oxyCODONE    IR 5 milliGRAM(s) Oral once PRN Moderate Pain (4 - 6)      44yyo s/p VNOTES, TLH, BS, Cysto EBL 100cc, recovering well in acute post-operative state.    Neuro: PO pain meds   CV: Hemodynamically stable, VSS reviewed and stable   Pulm: Encourage oob/ambulation, incentive spirometer at bedside  GI: Advance diet as tolerated  : DTV before discharge  Heme: Early ambulation for DVT PPX  ID: afebrile  Endo: no active issues  Dispo: continue routine post-op care, dc when patient voids, return precautions given.     DTaveras PGY3

## 2024-11-07 NOTE — ASU PREOP CHECKLIST - RESPIRATORY RATE (BREATHS/MIN)
Discussed importance of influenza vaccine. Patient declines her influenza vaccine at this time. States that she will discuss with her daughter.   16

## 2024-11-07 NOTE — ASU DISCHARGE PLAN (ADULT/PEDIATRIC) - PROVIDER TOKENS
FREE:[LAST:[[facility]],PHONE:[phone]],FAX:[(   )    -],ADDRESS:[[phone] [place] [address] the oncology [place] [id number]],FOLLOWUP:[2 weeks]]

## 2024-11-07 NOTE — ASU DISCHARGE PLAN (ADULT/PEDIATRIC) - FINANCIAL ASSISTANCE
[facility] provides services at a reduced cost to those who are determined to be eligible through [facility]’s financial assistance program. Information regarding [facility]’s financial assistance program can be found by going to [email or web address] or by calling [phone].

## 2024-11-07 NOTE — BRIEF OPERATIVE NOTE - NSICDXBRIEFPOSTOP_GEN_ALL_CORE_FT
POST-OP DIAGNOSIS:  Fibroid uterus 07-Nov-2024 17:50:52  Jael Colon  Heavy menstrual bleeding 07-Nov-2024 17:50:47  Jael Colon

## 2024-11-07 NOTE — BRIEF OPERATIVE NOTE - NSICDXBRIEFPREOP_GEN_ALL_CORE_FT
PRE-OP DIAGNOSIS:  Heavy menstrual bleeding 07-Nov-2024 17:50:38  Jael Colon  Fibroid uterus 07-Nov-2024 17:50:43  Jael Colon

## 2024-11-07 NOTE — ASU DISCHARGE PLAN (ADULT/PEDIATRIC) - ASU DC SPECIAL INSTRUCTIONSFT
Return to your regular way of eating.  Resume normal activity as tolerated, but no heavy lifting or strenuous activity for 4-6 weeks.  No driving for next 2 weeks and/or while on narcotic pain medication.  Complete vaginal rest, no tampons, no douching, no tub bathing, no sexual activities for 6 weeks unless otherwise instructed by your doctor.  Call your doctor with any signs and symptoms of infection such as fever (especially greater than 100.4F), chills, nausea or vomiting.  Call your doctor with redness or swelling at the incision site, fluid leakage or wound separation.  Call your doctor if you're unable to tolerate food or have difficulty urinating.  Call your doctor if you have pain that is not relieved by your prescribed medications.  Notify your doctor with any other concerns.    Medications:   - Ibuprofen 600mg every 6 hours as needed for pain  - Acetaminophen 500mg every 4 hours as needed for pain  - Oxycodone 5mg every 6 hours as needed for pain.     Please follow up with [facility] clinic in 2-3 weeks.  --------------------------------------------------------------------------------  Instrucciones de descarga:      Ginecología  Vuelva a potts forma habitual de comer. Reanude la actividad normal según lo tolere, kathy no levante objetos pesados ni realice actividades extenuantes kyle 2-4 semanas. No conducir kyle las próximas 2 semanas y/o mientras esté tomando analgésicos narcóticos. Taylor vaginal completo, sin tampones, sin duchas vaginales, sin romeo en la nate, sin actividades sexuales kyle 6 semanas a menos que potts médico le indique lo contrario. Llame a potts médico si tiene signos y síntomas de infección, amy fiebre (especialmente más de 100.4F), escalofríos, náuseas o vómitos. Llame a potts médico si tiene enrojecimiento o hinchazón en el sitio de la incisión, pérdida de líquido o separación de la herida. Llame a potts médico si no puede tolerar los alimentos o tiene dificultad para orinar. Llame a potts médico si tiene dolor que no se vik con los medicamentos recetados. Informe a potts médico sobre cualquier otra inquietud.    Medicamentos:  - Ibuprofeno 600mg cada 6 horas según necesidad para el dolor  - Acetaminofén 500 mg cada 4 horas según sea necesario para el dolor  - Oxycodone 5mg cada 6 horas según necesidad para el dolor

## 2024-11-07 NOTE — BRIEF OPERATIVE NOTE - OPERATION/FINDINGS
Exam under anesthesia demonstrated normal external genitalia, vaginal mucosa, and cervix. Mobile 10w uterus.   Pelvic survey demonstrated by normal bilateral tubes and ovaries. Uterus with known 3cm fibroid. Omental adhesions noted to the anterior abdominal wall and right fallopian tubes.

## 2024-11-07 NOTE — ASU DISCHARGE PLAN (ADULT/PEDIATRIC) - PAIN MANAGEMENT
Prescription called to pharmacy/Take over the counter pain medication Prescription called to pharmacy/Take over the counter pain medication/Prescriptions electronically submitted to pharmacy from [facility]

## 2024-11-07 NOTE — ASU DISCHARGE PLAN (ADULT/PEDIATRIC) - NS MD DC FALL RISK RISK
For information on Fall & Injury Prevention, visit: [email or web address] OR  [email or web address] OR  https://www.cdc.gov/steadi/patient.html

## 2024-11-07 NOTE — BRIEF OPERATIVE NOTE - NSICDXBRIEFPROCEDURE_GEN_ALL_CORE_FT
PROCEDURES:  Hysterectomy, endoscopic, vaginal transluminal natural orifice approach 07-Nov-2024 17:49:20  Jael Colon  Laparoscopic bilateral salpingectomy 07-Nov-2024 17:49:40  Jael Colon  Cystoscopy 07-Nov-2024 17:50:05  Jael Colon

## 2024-11-07 NOTE — ASU DISCHARGE PLAN (ADULT/PEDIATRIC) - NURSING INSTRUCTIONS
You were given intravenous TYLENOL for pain management at 11.30 am. Please DO NOT take any products containing TYLENOL or ACETAMINOPHEN, such as VICODIN, PERCOCET, EXCEDRIN, and any over-the-counter cold medication for the next 6 hours (until  5.30 pm. DO NOT TAKE MORE THAN 3000 MG OF TYLENOL in a 24 hour period.

## 2024-11-09 PROBLEM — R16.0 HEPATOMEGALY, NOT ELSEWHERE CLASSIFIED: Chronic | Status: ACTIVE | Noted: 2024-10-30

## 2024-11-14 LAB — SURGICAL PATHOLOGY STUDY: SIGNIFICANT CHANGE UP

## 2024-11-15 ENCOUNTER — NON-APPOINTMENT (OUTPATIENT)
Age: 44
End: 2024-11-15

## 2024-11-21 ENCOUNTER — OUTPATIENT (OUTPATIENT)
Dept: OUTPATIENT SERVICES | Facility: HOSPITAL | Age: 44
LOS: 1 days | End: 2024-11-21

## 2024-11-21 ENCOUNTER — APPOINTMENT (OUTPATIENT)
Dept: OBGYN | Facility: HOSPITAL | Age: 44
End: 2024-11-21
Payer: COMMERCIAL

## 2024-11-21 VITALS
WEIGHT: 128 LBS | DIASTOLIC BLOOD PRESSURE: 63 MMHG | HEART RATE: 83 BPM | HEIGHT: 56 IN | SYSTOLIC BLOOD PRESSURE: 119 MMHG | BODY MASS INDEX: 28.79 KG/M2 | TEMPERATURE: 98.2 F

## 2024-11-21 DIAGNOSIS — Z98.890 OTHER SPECIFIED POSTPROCEDURAL STATES: Chronic | ICD-10-CM

## 2024-11-21 DIAGNOSIS — Z98.51 TUBAL LIGATION STATUS: Chronic | ICD-10-CM

## 2024-11-21 DIAGNOSIS — R14.1 GAS PAIN: ICD-10-CM

## 2024-11-21 DIAGNOSIS — Z90.49 ACQUIRED ABSENCE OF OTHER SPECIFIED PARTS OF DIGESTIVE TRACT: Chronic | ICD-10-CM

## 2024-11-21 PROCEDURE — 99024 POSTOP FOLLOW-UP VISIT: CPT

## 2024-11-21 RX ORDER — SIMETHICONE 80 MG/1
80 TABLET, CHEWABLE ORAL
Qty: 40 | Refills: 0 | Status: ACTIVE | COMMUNITY
Start: 2024-11-21 | End: 1900-01-01

## 2024-11-22 DIAGNOSIS — Z98.890 OTHER SPECIFIED POSTPROCEDURAL STATES: ICD-10-CM

## 2024-11-22 DIAGNOSIS — R14.1 GAS PAIN: ICD-10-CM

## 2024-12-26 ENCOUNTER — APPOINTMENT (OUTPATIENT)
Dept: OBGYN | Facility: HOSPITAL | Age: 44
End: 2024-12-26

## 2024-12-26 ENCOUNTER — OUTPATIENT (OUTPATIENT)
Dept: OUTPATIENT SERVICES | Facility: HOSPITAL | Age: 44
LOS: 1 days | End: 2024-12-26

## 2024-12-26 VITALS
BODY MASS INDEX: 29.47 KG/M2 | TEMPERATURE: 98.1 F | DIASTOLIC BLOOD PRESSURE: 68 MMHG | HEART RATE: 79 BPM | SYSTOLIC BLOOD PRESSURE: 122 MMHG | WEIGHT: 131 LBS | HEIGHT: 56 IN

## 2024-12-26 DIAGNOSIS — Z90.49 ACQUIRED ABSENCE OF OTHER SPECIFIED PARTS OF DIGESTIVE TRACT: Chronic | ICD-10-CM

## 2024-12-26 DIAGNOSIS — Z98.890 OTHER SPECIFIED POSTPROCEDURAL STATES: Chronic | ICD-10-CM

## 2024-12-26 DIAGNOSIS — Z98.51 TUBAL LIGATION STATUS: Chronic | ICD-10-CM

## 2024-12-26 PROCEDURE — ZZZZZ: CPT

## 2024-12-31 DIAGNOSIS — Z98.890 OTHER SPECIFIED POSTPROCEDURAL STATES: ICD-10-CM

## 2025-01-23 ENCOUNTER — OUTPATIENT (OUTPATIENT)
Dept: OUTPATIENT SERVICES | Facility: HOSPITAL | Age: 45
LOS: 1 days | End: 2025-01-23

## 2025-01-23 ENCOUNTER — APPOINTMENT (OUTPATIENT)
Dept: OBGYN | Facility: HOSPITAL | Age: 45
End: 2025-01-23

## 2025-01-23 VITALS
WEIGHT: 135 LBS | SYSTOLIC BLOOD PRESSURE: 113 MMHG | HEIGHT: 56 IN | BODY MASS INDEX: 30.37 KG/M2 | DIASTOLIC BLOOD PRESSURE: 74 MMHG | TEMPERATURE: 98.1 F | HEART RATE: 81 BPM

## 2025-01-23 DIAGNOSIS — Z98.890 OTHER SPECIFIED POSTPROCEDURAL STATES: ICD-10-CM

## 2025-01-23 DIAGNOSIS — Z98.890 OTHER SPECIFIED POSTPROCEDURAL STATES: Chronic | ICD-10-CM

## 2025-01-23 DIAGNOSIS — Z90.49 ACQUIRED ABSENCE OF OTHER SPECIFIED PARTS OF DIGESTIVE TRACT: Chronic | ICD-10-CM

## 2025-01-23 DIAGNOSIS — Z98.51 TUBAL LIGATION STATUS: Chronic | ICD-10-CM

## 2025-01-23 DIAGNOSIS — Z23 ENCOUNTER FOR IMMUNIZATION: ICD-10-CM

## 2025-01-23 PROCEDURE — 99024 POSTOP FOLLOW-UP VISIT: CPT

## 2025-01-23 PROCEDURE — 90651 9VHPV VACCINE 2/3 DOSE IM: CPT | Mod: NC

## 2025-01-25 PROBLEM — Z98.890 POST-OPERATIVE STATE: Status: ACTIVE | Noted: 2025-01-25

## 2025-01-28 DIAGNOSIS — Z23 ENCOUNTER FOR IMMUNIZATION: ICD-10-CM

## 2025-01-28 DIAGNOSIS — Z98.890 OTHER SPECIFIED POSTPROCEDURAL STATES: ICD-10-CM

## 2025-07-07 NOTE — PACU DISCHARGE NOTE - AIRWAY PATENCY:
PATIENT INFORMATION    Anticipatory guidance discussed  Bicycle helmets  Importance of regular dental care  Importance of regular exercise  Importance of varied diet  Puberty    Follow-Up  - Return for your yearly well child visit.    13 years old Health and Safety Tips - The following hyperlinks are available to access via [employer]    Parent Education from [employer]    Educación para padres sobre niños sanos    Additional Educational Resources:  For additional resources regarding your symptoms, diagnosis, or further health information, please visit the Discover a Healthier You section on [email or web address] or the Online Health Resources section in [employer].    
Satisfactory

## (undated) DEVICE — DRSG TEGADERM 2.5X3"

## (undated) DEVICE — ELCTR GROUNDING PAD ADULT COVIDIEN

## (undated) DEVICE — PROTECTOR HEEL / ELBOW FLUFFY

## (undated) DEVICE — GLV 7.5 PROTEXIS (WHITE)

## (undated) DEVICE — PACK GENERAL LAPAROSCOPY

## (undated) DEVICE — FOLEY TRAY 16FR 5CC LF UMETER CLOSED

## (undated) DEVICE — VENODYNE/SCD SLEEVE CALF MEDIUM

## (undated) DEVICE — SOL IRR POUR NS 0.9% 500ML

## (undated) DEVICE — TUBING OLYMPUS INSUFFLATION

## (undated) DEVICE — UTERINE MANIPULATOR COOPER SURGICAL 5MM 33CM GREEN

## (undated) DEVICE — UTERINE MANIPULATOR CLINICAL INNOVATIONS CLEARVIEW 7CM

## (undated) DEVICE — SOL IRR POUR H2O 500ML

## (undated) DEVICE — DRSG MASTISOL

## (undated) DEVICE — BASIN SET SINGLE

## (undated) DEVICE — TRAP SPECIMEN SPUTUM 40CC

## (undated) DEVICE — ENDOCATCH 10MM SPECIMEN POUCH

## (undated) DEVICE — DRSG TELFA 3 X 8

## (undated) DEVICE — SYR LUER LOK 10CC

## (undated) DEVICE — TROCAR APPLIED MEDICAL KII BALLOON BLUNT TIP 12MM X 130MM

## (undated) DEVICE — POSITIONER STRAP ARMBOARD VELCRO TS-30

## (undated) DEVICE — DRSG STERISTRIPS 0.5 X 4"

## (undated) DEVICE — SUT ETHIBOND ENDOKNOT 0 42" ST3

## (undated) DEVICE — TIP METZENBAUM SCISSOR MONOPOLAR ENDOCUT (ORANGE)

## (undated) DEVICE — LIGASURE BLUNT TIP 37CM

## (undated) DEVICE — LIGASURE MARYLAND 37CM

## (undated) DEVICE — GOWN XL

## (undated) DEVICE — TROCAR COVIDIEN VERSAPORT BLADELESS OPTICAL 5MM STANDARD

## (undated) DEVICE — WARMING BLANKET FULL ADULT

## (undated) DEVICE — PREP BETADINE KIT

## (undated) DEVICE — PACK D&C

## (undated) DEVICE — BLADE SURGICAL #15 CARBON

## (undated) DEVICE — TROCAR COVIDIEN VERSAONE BLUNT TIP HASSAN 12MM

## (undated) DEVICE — TUBING HYDRO-SURG PLUS IRRIGATOR W SMOKEVAC & PROBE

## (undated) DEVICE — GLV 8 PROTEXIS (BLUE)

## (undated) DEVICE — GELPOINT V-PATH TRANSVAGINAL ACCESS 9.5CM

## (undated) DEVICE — SUT MONOCRYL 4-0 27" PS-2 UNDYED

## (undated) DEVICE — SMOKE EVACUTATION SYS LAPROSCOPIC AC/PA

## (undated) DEVICE — CANISTER DISPOSABLE THIN WALL 3000CC

## (undated) DEVICE — INSUFFLATION NDL COVIDIEN SURGINEEDLE VERESS 120MM

## (undated) DEVICE — SUT VICRYL 0 27" UR-6